# Patient Record
Sex: FEMALE | Race: WHITE | NOT HISPANIC OR LATINO | URBAN - METROPOLITAN AREA
[De-identification: names, ages, dates, MRNs, and addresses within clinical notes are randomized per-mention and may not be internally consistent; named-entity substitution may affect disease eponyms.]

---

## 2021-03-08 ENCOUNTER — EMERGENCY (EMERGENCY)
Facility: HOSPITAL | Age: 86
LOS: 0 days | Discharge: HOME | End: 2021-03-08
Attending: EMERGENCY MEDICINE
Payer: MEDICARE

## 2021-03-08 VITALS
TEMPERATURE: 98 F | DIASTOLIC BLOOD PRESSURE: 79 MMHG | WEIGHT: 124.78 LBS | SYSTOLIC BLOOD PRESSURE: 173 MMHG | HEART RATE: 116 BPM | RESPIRATION RATE: 19 BRPM | OXYGEN SATURATION: 98 %

## 2021-03-08 VITALS
HEART RATE: 93 BPM | OXYGEN SATURATION: 97 % | DIASTOLIC BLOOD PRESSURE: 83 MMHG | SYSTOLIC BLOOD PRESSURE: 173 MMHG | TEMPERATURE: 97 F | RESPIRATION RATE: 18 BRPM

## 2021-03-08 DIAGNOSIS — S42.222A 2-PART DISPLACED FRACTURE OF SURGICAL NECK OF LEFT HUMERUS, INITIAL ENCOUNTER FOR CLOSED FRACTURE: ICD-10-CM

## 2021-03-08 DIAGNOSIS — S12.9XXA FRACTURE OF NECK, UNSPECIFIED, INITIAL ENCOUNTER: ICD-10-CM
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
## 2021-03-08 PROCEDURE — 71045 X-RAY EXAM CHEST 1 VIEW: CPT | Mod: 26

## 2021-03-08 PROCEDURE — 73060 X-RAY EXAM OF HUMERUS: CPT | Mod: 26,LT

## 2021-03-08 PROCEDURE — 73070 X-RAY EXAM OF ELBOW: CPT | Mod: 26,LT

## 2021-03-08 PROCEDURE — 73030 X-RAY EXAM OF SHOULDER: CPT | Mod: 26,LT

## 2021-03-08 PROCEDURE — 93010 ELECTROCARDIOGRAM REPORT: CPT

## 2021-03-08 PROCEDURE — 70486 CT MAXILLOFACIAL W/O DYE: CPT | Mod: 26,MA

## 2021-03-08 PROCEDURE — 99285 EMERGENCY DEPT VISIT HI MDM: CPT

## 2021-03-08 PROCEDURE — 70450 CT HEAD/BRAIN W/O DYE: CPT | Mod: 26,MA

## 2021-03-08 PROCEDURE — 72125 CT NECK SPINE W/O DYE: CPT | Mod: 26,MA

## 2021-03-08 RX ORDER — ACETAMINOPHEN 500 MG
1 TABLET ORAL
Qty: 28 | Refills: 0
Start: 2021-03-08 | End: 2021-03-14

## 2021-03-08 RX ORDER — ACETAMINOPHEN 500 MG
975 TABLET ORAL ONCE
Refills: 0 | Status: COMPLETED | OUTPATIENT
Start: 2021-03-08 | End: 2021-03-08

## 2021-03-08 RX ADMIN — Medication 975 MILLIGRAM(S): at 15:46

## 2021-03-08 NOTE — ED PROVIDER NOTE - PHYSICAL EXAMINATION
Gen: Alert, NAD  Head: bruising left supraorbital region   Eyes: PERRL, EOMI, normal lids/conjunctiva  ENT: normal hearing, patent oropharynx without erythema/exudate, uvula midline  Neck: supple, no tenderness, Trachea midline  Pulm: Bilateral BS, normal resp effort, no wheeze/stridor/retractions  CV: RRR, no M/R/G, 2+ radial and dp pulses bl, no edema  Abd: soft, NT/ND, +BS, no hepatosplenomegaly  Mskel: extremities x4 with normal ROM and no joint effusions. no ctl spine ttp.   Skin: bruising and soft tissue swelling of the left upper arm  Neuro: AAOx3, no sensory/motor deficits, CN 2-12 intact

## 2021-03-08 NOTE — ED PROVIDER NOTE - NSFOLLOWUPINSTRUCTIONS_ED_ALL_ED_FT
You have a fracture of the neck of your humerus (upper arm just below shoulder).     This does not require surgery.     Take the pain medicine as needed and ice the shoulder as often as you can.     Call the orthopedist for a follow up appointment as soon as possible.

## 2021-03-08 NOTE — ED PROVIDER NOTE - OBJECTIVE STATEMENT
89F hx gerd and depression pw [facility] fall this am. patient fell coming out of bed hit her head and left shoulder. no loc. went to urgent care today because unable to life arm. they sent her here. she has not cp, sob, nausea, vomiting, ha, vision loss, epistaxis, rash, bleeding, numbness, hematuria. she notes bruising and pain at the left arm that are bad only when she moves it

## 2021-03-08 NOTE — ED ADULT TRIAGE NOTE - CHIEF COMPLAINT QUOTE
89 y/o female with no PMH. Presents to the ED with Left shoulder pain and bruise above the left eyebrow bone. pt states " I was putting the trash out last night when I lost my balance and fell onto the left side of my body." pt denies any loc, dizziness or weakness prior to fall , and no blood thinner use.

## 2021-03-08 NOTE — ED ADULT NURSE NOTE - OBJECTIVE STATEMENT
pt a&O x4, ambulatory at baseline. bruising to left tempora l. no open wounds . pt states no PMH. Presents to the ED with Left shoulder pain and bruise above the left  temporal. pt states " I was putting the trash out last night when I lost my balance and fell onto the left side of my body." pt denies any loc, dizziness or weakness prior to fall , and no blood thinner use. pt denies loc.

## 2021-03-08 NOTE — ED ADULT NURSE REASSESSMENT NOTE - NS ED NURSE REASSESS COMMENT FT1
pt expressed relief of pain once sling was placed on left arm. hematoma noted to left upper arm, cap refill <2 seconds in b/l upper extremities. pt and family verbalized understanding of follow-up with ortho and sling use.

## 2021-03-08 NOTE — ED ADULT NURSE NOTE - CHIEF COMPLAINT QUOTE
89 y/o female with no PMH. Presents to the ED with Left shoulder pain and bruise above the left eyebrow bone. pt states " I was putting the trash out last night when I lost my balance and fell onto the left side of my body." pt denies any loc, dizziness or weakness prior to fall , and no blood thinner use.

## 2021-03-08 NOTE — ED PROVIDER NOTE - CLINICAL SUMMARY MEDICAL DECISION MAKING FREE TEXT BOX
fall. likely humerus fx. fall. likely humerus fx. xray confirms humeral neck fx. can place in sling and dc home fall. likely humerus fx. xray confirms humeral neck fx. can place in sling and dc home  As interpreted by ED physician, ECG is NSR with normal intervals/axis, no changes in QRS, no ST/T changes.

## 2021-03-08 NOTE — ED PROVIDER NOTE - CARE PLAN
Principal Discharge DX:	Closed 2-part displaced fracture of surgical neck of left humerus, initial encounter

## 2021-03-08 NOTE — ED PROVIDER NOTE - PATIENT PORTAL LINK FT
You can access the FollowMyHealth Patient Portal offered by [facility] by registering at the following website: [email or web address]. By joining [employer]’s FollowMyHealth portal, you will also be able to view your health information using other applications (apps) compatible with our system.

## 2021-03-08 NOTE — ED PROVIDER NOTE - CARE PROVIDER_API CALL
Tab Barton (DO)  Orthopaedic Surgery  [address] [place] [address]  Phone: [phone]  Fax: [phone]  Follow Up Time: 1-3 Days

## 2022-12-31 ENCOUNTER — TRANSCRIPTION ENCOUNTER (OUTPATIENT)
Age: 87
End: 2022-12-31

## 2022-12-31 ENCOUNTER — INPATIENT (INPATIENT)
Facility: HOSPITAL | Age: 87
LOS: 6 days | Discharge: HOME HEALTH | End: 2023-01-07
Attending: STUDENT IN AN ORGANIZED HEALTH CARE EDUCATION/TRAINING PROGRAM | Admitting: STUDENT IN AN ORGANIZED HEALTH CARE EDUCATION/TRAINING PROGRAM
Payer: MEDICARE

## 2022-12-31 VITALS
TEMPERATURE: 98 F | HEART RATE: 100 BPM | RESPIRATION RATE: 18 BRPM | OXYGEN SATURATION: 100 % | DIASTOLIC BLOOD PRESSURE: 62 MMHG | SYSTOLIC BLOOD PRESSURE: 146 MMHG

## 2022-12-31 DIAGNOSIS — K21.9 GASTRO-ESOPHAGEAL REFLUX DISEASE WITHOUT ESOPHAGITIS: ICD-10-CM

## 2022-12-31 DIAGNOSIS — Z01.818 ENCOUNTER FOR OTHER PREPROCEDURAL EXAMINATION: ICD-10-CM

## 2022-12-31 DIAGNOSIS — F41.9 ANXIETY DISORDER, UNSPECIFIED: ICD-10-CM

## 2022-12-31 DIAGNOSIS — D72.829 ELEVATED WHITE BLOOD CELL COUNT, UNSPECIFIED: ICD-10-CM

## 2022-12-31 DIAGNOSIS — S72.009A FRACTURE OF UNSPECIFIED PART OF NECK OF UNSPECIFIED FEMUR, INITIAL ENCOUNTER FOR CLOSED FRACTURE: ICD-10-CM

## 2022-12-31 DIAGNOSIS — S72.142A DISPLACED INTERTROCHANTERIC FRACTURE OF LEFT FEMUR, INITIAL ENCOUNTER FOR CLOSED FRACTURE: ICD-10-CM

## 2022-12-31 LAB
ALBUMIN SERPL ELPH-MCNC: 4.2 G/DL — SIGNIFICANT CHANGE UP (ref 3.3–5)
ALP SERPL-CCNC: 80 U/L — SIGNIFICANT CHANGE UP (ref 40–120)
ALT FLD-CCNC: 11 U/L — SIGNIFICANT CHANGE UP (ref 4–33)
ANION GAP SERPL CALC-SCNC: 13 MMOL/L — SIGNIFICANT CHANGE UP (ref 7–14)
APTT BLD: 23.6 SEC — LOW (ref 27–36.3)
AST SERPL-CCNC: 22 U/L — SIGNIFICANT CHANGE UP (ref 4–32)
BASOPHILS # BLD AUTO: 0 K/UL — SIGNIFICANT CHANGE UP (ref 0–0.2)
BASOPHILS NFR BLD AUTO: 0 % — SIGNIFICANT CHANGE UP (ref 0–2)
BILIRUB SERPL-MCNC: 0.4 MG/DL — SIGNIFICANT CHANGE UP (ref 0.2–1.2)
BUN SERPL-MCNC: 17 MG/DL — SIGNIFICANT CHANGE UP (ref 7–23)
CALCIUM SERPL-MCNC: 9.6 MG/DL — SIGNIFICANT CHANGE UP (ref 8.4–10.5)
CHLORIDE SERPL-SCNC: 104 MMOL/L — SIGNIFICANT CHANGE UP (ref 98–107)
CK SERPL-CCNC: 73 U/L — SIGNIFICANT CHANGE UP (ref 25–170)
CO2 SERPL-SCNC: 24 MMOL/L — SIGNIFICANT CHANGE UP (ref 22–31)
CREAT SERPL-MCNC: 0.93 MG/DL — SIGNIFICANT CHANGE UP (ref 0.5–1.3)
EGFR: 58 ML/MIN/1.73M2 — LOW
EOSINOPHIL # BLD AUTO: 0 K/UL — SIGNIFICANT CHANGE UP (ref 0–0.5)
EOSINOPHIL NFR BLD AUTO: 0 % — SIGNIFICANT CHANGE UP (ref 0–6)
GLUCOSE SERPL-MCNC: 164 MG/DL — HIGH (ref 70–99)
HCT VFR BLD CALC: 35.1 % — SIGNIFICANT CHANGE UP (ref 34.5–45)
HGB BLD-MCNC: 11.4 G/DL — LOW (ref 11.5–15.5)
IANC: 13.05 K/UL — HIGH (ref 1.8–7.4)
INR BLD: 1.01 RATIO — SIGNIFICANT CHANGE UP (ref 0.88–1.16)
LYMPHOCYTES # BLD AUTO: 0.36 K/UL — LOW (ref 1–3.3)
LYMPHOCYTES # BLD AUTO: 2.6 % — LOW (ref 13–44)
MANUAL SMEAR VERIFICATION: SIGNIFICANT CHANGE UP
MCHC RBC-ENTMCNC: 29.2 PG — SIGNIFICANT CHANGE UP (ref 27–34)
MCHC RBC-ENTMCNC: 32.5 GM/DL — SIGNIFICANT CHANGE UP (ref 32–36)
MCV RBC AUTO: 90 FL — SIGNIFICANT CHANGE UP (ref 80–100)
MONOCYTES # BLD AUTO: 0.24 K/UL — SIGNIFICANT CHANGE UP (ref 0–0.9)
MONOCYTES NFR BLD AUTO: 1.7 % — LOW (ref 2–14)
NEUTROPHILS # BLD AUTO: 13.31 K/UL — HIGH (ref 1.8–7.4)
NEUTROPHILS NFR BLD AUTO: 95.7 % — HIGH (ref 43–77)
PLAT MORPH BLD: NORMAL — SIGNIFICANT CHANGE UP
PLATELET # BLD AUTO: 300 K/UL — SIGNIFICANT CHANGE UP (ref 150–400)
PLATELET COUNT - ESTIMATE: NORMAL — SIGNIFICANT CHANGE UP
POLYCHROMASIA BLD QL SMEAR: SLIGHT — SIGNIFICANT CHANGE UP
POTASSIUM SERPL-MCNC: 5 MMOL/L — SIGNIFICANT CHANGE UP (ref 3.5–5.3)
POTASSIUM SERPL-SCNC: 5 MMOL/L — SIGNIFICANT CHANGE UP (ref 3.5–5.3)
PROT SERPL-MCNC: 7.1 G/DL — SIGNIFICANT CHANGE UP (ref 6–8.3)
PROTHROM AB SERPL-ACNC: 11.7 SEC — SIGNIFICANT CHANGE UP (ref 10.5–13.4)
RBC # BLD: 3.9 M/UL — SIGNIFICANT CHANGE UP (ref 3.8–5.2)
RBC # FLD: 13.1 % — SIGNIFICANT CHANGE UP (ref 10.3–14.5)
RBC BLD AUTO: NORMAL — SIGNIFICANT CHANGE UP
SODIUM SERPL-SCNC: 141 MMOL/L — SIGNIFICANT CHANGE UP (ref 135–145)
WBC # BLD: 13.91 K/UL — HIGH (ref 3.8–10.5)
WBC # FLD AUTO: 13.91 K/UL — HIGH (ref 3.8–10.5)

## 2022-12-31 PROCEDURE — 99222 1ST HOSP IP/OBS MODERATE 55: CPT

## 2022-12-31 PROCEDURE — 99284 EMERGENCY DEPT VISIT MOD MDM: CPT

## 2022-12-31 PROCEDURE — 72192 CT PELVIS W/O DYE: CPT | Mod: 26,MA

## 2022-12-31 PROCEDURE — 71045 X-RAY EXAM CHEST 1 VIEW: CPT | Mod: 26

## 2022-12-31 PROCEDURE — 73502 X-RAY EXAM HIP UNI 2-3 VIEWS: CPT | Mod: 26,LT

## 2022-12-31 PROCEDURE — 70450 CT HEAD/BRAIN W/O DYE: CPT | Mod: 26,MA

## 2022-12-31 PROCEDURE — 73552 X-RAY EXAM OF FEMUR 2/>: CPT | Mod: 26,LT

## 2022-12-31 RX ORDER — MAGNESIUM HYDROXIDE 400 MG/1
30 TABLET, CHEWABLE ORAL DAILY
Refills: 0 | Status: DISCONTINUED | OUTPATIENT
Start: 2022-12-31 | End: 2023-01-07

## 2022-12-31 RX ORDER — ESCITALOPRAM OXALATE 10 MG/1
1 TABLET, FILM COATED ORAL
Qty: 0 | Refills: 0 | DISCHARGE

## 2022-12-31 RX ORDER — ACETAMINOPHEN 500 MG
975 TABLET ORAL ONCE
Refills: 0 | Status: COMPLETED | OUTPATIENT
Start: 2022-12-31 | End: 2022-12-31

## 2022-12-31 RX ORDER — FAMOTIDINE 10 MG/ML
20 INJECTION INTRAVENOUS EVERY 24 HOURS
Refills: 0 | Status: DISCONTINUED | OUTPATIENT
Start: 2022-12-31 | End: 2023-01-07

## 2022-12-31 RX ORDER — OXYCODONE HYDROCHLORIDE 5 MG/1
5 TABLET ORAL EVERY 4 HOURS
Refills: 0 | Status: DISCONTINUED | OUTPATIENT
Start: 2022-12-31 | End: 2023-01-07

## 2022-12-31 RX ORDER — OXYCODONE HYDROCHLORIDE 5 MG/1
2.5 TABLET ORAL EVERY 4 HOURS
Refills: 0 | Status: DISCONTINUED | OUTPATIENT
Start: 2022-12-31 | End: 2023-01-07

## 2022-12-31 RX ORDER — HEPARIN SODIUM 5000 [USP'U]/ML
5000 INJECTION INTRAVENOUS; SUBCUTANEOUS ONCE
Refills: 0 | Status: COMPLETED | OUTPATIENT
Start: 2022-12-31 | End: 2022-12-31

## 2022-12-31 RX ORDER — MORPHINE SULFATE 50 MG/1
4 CAPSULE, EXTENDED RELEASE ORAL ONCE
Refills: 0 | Status: DISCONTINUED | OUTPATIENT
Start: 2022-12-31 | End: 2022-12-31

## 2022-12-31 RX ORDER — ESCITALOPRAM OXALATE 10 MG/1
10 TABLET, FILM COATED ORAL EVERY 24 HOURS
Refills: 0 | Status: DISCONTINUED | OUTPATIENT
Start: 2022-12-31 | End: 2023-01-07

## 2022-12-31 RX ORDER — ACETAMINOPHEN 500 MG
975 TABLET ORAL EVERY 8 HOURS
Refills: 0 | Status: DISCONTINUED | OUTPATIENT
Start: 2022-12-31 | End: 2023-01-07

## 2022-12-31 RX ORDER — SENNA PLUS 8.6 MG/1
2 TABLET ORAL AT BEDTIME
Refills: 0 | Status: DISCONTINUED | OUTPATIENT
Start: 2022-12-31 | End: 2023-01-07

## 2022-12-31 RX ADMIN — MORPHINE SULFATE 4 MILLIGRAM(S): 50 CAPSULE, EXTENDED RELEASE ORAL at 20:10

## 2022-12-31 RX ADMIN — HEPARIN SODIUM 5000 UNIT(S): 5000 INJECTION INTRAVENOUS; SUBCUTANEOUS at 23:12

## 2022-12-31 RX ADMIN — Medication 975 MILLIGRAM(S): at 20:10

## 2022-12-31 NOTE — ED ADULT TRIAGE NOTE - CHIEF COMPLAINT QUOTE
pt c/o pain to left upper leg after she slipped in the bathtub.  pt hit the outside of the leg.  no deformity noted.  pt was able to bear weight on scene pt c/o pain to left upper leg after she slipped in the bathtub.  pt hit the outside of the leg.  mild shortening noted.  pt was able to bear weight on scene

## 2022-12-31 NOTE — H&P ADULT - HISTORY OF PRESENT ILLNESS
HPI  89y Female presents s/p [facility] fall c/o severe Left hip pain and inability to ambulate.  Patient denies headstrike or LOC. Patient denies radiation of pain. Patient denies numbness/tingling/burning in the LLE. No other bone/joint complaints. Patient is a community ambulator at baseline without assistive devices.    PAST MEDICAL & SURGICAL HISTORY:  No pertinent past medical history      No significant past surgical history        MEDICATIONS  (STANDING):    MEDICATIONS  (PRN):    Allergies    No Known Allergies    Intolerances                              11.4   13.91 )-----------( 300      ( 31 Dec 2022 20:30 )             35.1     12-31    141  |  104  |  17  ----------------------------<  164<H>  5.0   |  24  |  0.93    Ca    9.6      31 Dec 2022 20:30    TPro  7.1  /  Alb  4.2  /  TBili  0.4  /  DBili  x   /  AST  22  /  ALT  11  /  AlkPhos  80  12-31    PT/INR - ( 31 Dec 2022 20:30 )   PT: 11.7 sec;   INR: 1.01 ratio         PTT - ( 31 Dec 2022 20:30 )  PTT:23.6 sec    T(C): 36.8 (12-31-22 @ 18:26), Max: 36.8 (12-31-22 @ 18:26)  HR: 102 (12-31-22 @ 20:08) (100 - 102)  BP: 144/72 (12-31-22 @ 20:08) (144/72 - 146/62)  RR: 16 (12-31-22 @ 20:08) (16 - 18)  SpO2: 99% (12-31-22 @ 20:08) (99% - 100%)  Wt(kg): --    Physical Exam  Gen: NAD  Resp: Non-labored breathing  LLE:  Skin intact  No ecchymosis  Minimal soft tissue swelling of proximal thigh  + TTP about hip   ROM lmited 2/2 pain   Unable to SLR  + Log Roll/Heel Strike  No TTP to knee/leg/ankle/foot   Motor intact GS/TA/FHL/EHL  SILT L2-S1  Compartments soft  DP pulses palpable    _LE/BUE:   No bony TTP; Good ROM w/o pain; Exam Unremarkable    Imaging:  XR demonstrating L  intertrochanteric femur fracture

## 2022-12-31 NOTE — ED PROVIDER NOTE - OBJECTIVE STATEMENT
Attendin-year-old female presents with left hip pain status post trip and fall today.  Patient was using the bathroom and lost her balance when she was getting off the toilet and fell landing on her left hip.  Patient was unable to get up and was on the ground for a long period of time today until she was found by her daughter this afternoon.  Patient is alert and oriented.  Denies other pain complaints.  Patient states she did not hit her head.  Past medical history is significant for depression and GERD.  She only takes an antidepressant and a H2 blocker.

## 2022-12-31 NOTE — ED PROVIDER NOTE - CLINICAL SUMMARY MEDICAL DECISION MAKING FREE TEXT BOX
Left hip pain status post mechanical fall earlier today.  We will get x-rays of the hip and give pain medication.  High likelihood of fracture.  Will consult orthopedics if hip is fractured.  We will also get CT of head because of unwitnessed fall and patient's age.

## 2022-12-31 NOTE — H&P ADULT - ATTENDING COMMENTS
Patient seen and examined. Ms. Ashford is a 89 year old female, PMH GERD, Depression, who presented to the [facility] Emergency Department following a fall at home. Patient was in the bathroom, when she lost her balance and fell onto her left side. She reports no head strike. Patient was unable to get up and was on the floor for a few hours. She states that her neighbor came over to check on her and found the patient on the ground. Patient was then brought to the [facility] Emergency Department for evaluation. CT Head was negative. XRays showed a left intertrochanteric hip fracture. Orthopedics was consulted and patient was admitted for operative management. Medicine was consulted and patient was cleared for surgery. Patient reports no hip pain prior to fall. She has been walking with a cane for the last two months. Patient currently lives alone.    Exam:  Patient Alert & Oriented x4  Skin intact, no lesions or erythema  Left lower Extremity: Shortened, Externally Rotated  Motor: Grossly intact EHL/FHL/Tibialis Anterior/Gastrocnemius  Sensory: Grossly intact superficial peroneal/deep peroneal/saphenous/sural/tibial nerves  Vascular: 2+ DP Pulse  No tenderness over the right lower extremity or left tibia  No tenderness over the bilateral upper extremities  No pain with range of motion of the right lower extremity or bilateral upper extremities    Assessment/Plan:  Ms. Ashford is a 89 year old female with a left intertrochanteric hip fracture. Discussed with the patient that she has a left intertrochanteric hip fracture. Discussed the management of her hip fracture, including open reduction, internal fixation with an intramedullary nail. Discussed the implant and surgical procedure. Discussed the recovery from ORIF, including hospital stay, possible rehab placement and postoperative ambulation. Discussed the risks of surgery including, but not limited to, blood loss, injuries to nerves and vessels, heart attack, stroke, death, organ failure, nonunion, malunion, hardware failure, failure to achieve desired results, decreased ambulation, and infection. Discussed the alternatives to surgery, including nonoperative management. Discussed that nonoperative management would consist of bedrest for several weeks. Discussed the risks of nonoperative management, including continued pain, pneumonia, UTI, pressure ulcers, and death. Patient would like to proceed with surgery. Informed consent was obtained. Patient was alert and oriented x4.    Plan:  - OR Today  - NPO  - Left Lower Extremity: Non-weight bearing  - Medicine Clearance  - IV Fluids  - Pain control

## 2022-12-31 NOTE — H&P ADULT - ASSESSMENT
Assessment and Plan    Patient is a 89y y/o Female who presented with  hip pain found to have  femoral Left intertrochanteric femur fracture. Patient is currently hemodynamically stable.     - Pending CT head and CT hip  - Multimodal Pain control  - Bed Rest  - NPO/IVF after MN  - Suarez catheter  - CBC/BMP/Coags/UA/T+S x2  - EKG/CXR  - Medical clearance pending  - Plan for OR for IMN on 1/1/23    Orthopaedic Surgery  [facility] [id number]  [facility]        [id number]  [facility]  [id number]/ [phone]

## 2022-12-31 NOTE — CONSULT NOTE ADULT - PROBLEM SELECTOR RECOMMENDATION 5
wbc 13.4, likely reactive in setting of acute fx  - continue to monitor Stable. C/w famotidine 20 mg QD

## 2022-12-31 NOTE — ED ADULT NURSE NOTE - CHIEF COMPLAINT QUOTE
pt c/o pain to left upper leg after she slipped in the bathtub.  pt hit the outside of the leg.  mild shortening noted.  pt was able to bear weight on scene

## 2022-12-31 NOTE — CONSULT NOTE ADULT - ASSESSMENT
Pt is a 89 y/o F w/ pmhx of depression and GERD presenting with left hip pain after a mechanical fall at home today. Pt found to have left intertrochanteric fx. Pt seen and evaluated ortho and plan for OR 1/1/2023. Medicine consulted for preop evaluation.

## 2022-12-31 NOTE — CONSULT NOTE ADULT - PROBLEM SELECTOR RECOMMENDATION 9
Pt w/ left hip pain s/p mechanical fall found to have left intertrochanteric fx. Pt pending OR with ortho.  - management per ortho

## 2022-12-31 NOTE — ED ADULT NURSE NOTE - CAS EDN DISCHARGE INTERVENTIONS
Patient complaining of new onset of cough with a lot of sputum. Writer observed an unproductive, weak cough. Asked patient to call writer into room if coughs up any more sputum. Will continue to monitor.   IV intact

## 2022-12-31 NOTE — CONSULT NOTE ADULT - SUBJECTIVE AND OBJECTIVE BOX
HPI:  Pt is a 89 y/o F w/ pmhx of depression and GERD presenting with left hip pain after a mechanical fall at home today. Pt states she was getting up from the toilet when she lost her balance, slipped and hit her left side on the door and fell to the ground. She states she tried to get up how was unable to due to pain in her left hip. She denies any loss of consciousness, head trauma, chest pain, SOB, palpitations, light headedness or dizziness. She states she was otherwise in her usual state of health. She states she lives at home by herself and independent of her ADLs. She states she uses a cane sometimes to ambulate. She is able to walk up 1 flight of stairs without any chest pain or SOB. Of note, pt and pt's daughter states she had a normal nuclear stress test about 6 month ago.        Review of Systems:   CONSTITUTIONAL: No fever, weight loss, or fatigue  EYES: No eye pain, visual disturbances, or discharge  ENMT: No sinus or throat pain  NECK: No pain or stiffness  RESPIRATORY: No cough, wheezing, chills or hemoptysis; No shortness of breath  CARDIOVASCULAR: No chest pain, palpitations, dizziness, or leg swelling  GASTROINTESTINAL: No abdominal or epigastric pain. No nausea, vomiting, or hematemesis; No diarrhea or constipation. No melena or hematochezia.  GENITOURINARY: No dysuria, frequency, hematuria, or incontinence  NEUROLOGICAL: No headaches, loss of strength, numbness, or tremors  SKIN: No itching, burning, rashes, or lesions   LYMPH NODES: No enlarged glands  MUSCULOSKELETAL: +left hip pain  HEME/LYMPH: No easy bruising, or bleeding gums  ALLERY AND IMMUNOLOGIC: No hives or eczema    PAST MEDICAL & SURGICAL HISTORY:  Medical Hx:  GERD  depression    Surgical Hx  hernia repair  hysterectomy    Medications:  escitalopram 10 mg QD  famotidine 20 mg BID    Allergies    No Known Allergies    Social History: never smoker, occasional social alcohol use, no illicit drug use. Lives at home by herself. Independent of ADLs. Uses cane intermittently.     FAMILY HISTORY:  No family hx of heart disease.     MEDICATIONS  (STANDING):  acetaminophen     Tablet .. 975 milliGRAM(s) Oral every 8 hours  escitalopram 10 milliGRAM(s) Oral every 24 hours  famotidine    Tablet 20 milliGRAM(s) Oral every 24 hours  heparin   Injectable 5000 Unit(s) SubCutaneous once  senna 2 Tablet(s) Oral at bedtime    MEDICATIONS  (PRN):  magnesium hydroxide Suspension 30 milliLiter(s) Oral daily PRN Constipation  oxyCODONE    IR 2.5 milliGRAM(s) Oral every 4 hours PRN Moderate Pain (4 - 6)  oxyCODONE    IR 5 milliGRAM(s) Oral every 4 hours PRN Severe Pain (7 - 10)        CAPILLARY BLOOD GLUCOSE        I&O's Summary      PHYSICAL EXAM:  GENERAL: elderly female, NAD  HEAD:  Atraumatic, Normocephalic  EYES: EOMI, PERRLA, conjunctiva and sclera clear  NECK: Supple, No JVD  CHEST/LUNG: Clear to auscultation bilaterally; No wheeze  HEART: Regular rate and rhythm; No murmurs, rubs, or gallops  ABDOMEN: Soft, Nontender, Nondistended; Bowel sounds present  EXTREMITIES:  2+ Peripheral Pulses, No clubbing, cyanosis, or edema. LLE shortened and externally rotated. Left hip tender to palpation, limited ROM 2/2 pain.   PSYCH: AAOx3  NEUROLOGY: non-focal  SKIN: No rashes or lesions    LABS:                        11.4   13.91 )-----------( 300      ( 31 Dec 2022 20:30 )             35.1     12-31    141  |  104  |  17  ----------------------------<  164<H>  5.0   |  24  |  0.93    Ca    9.6      31 Dec 2022 20:30    TPro  7.1  /  Alb  4.2  /  TBili  0.4  /  DBili  x   /  AST  22  /  ALT  11  /  AlkPhos  80  12-31    PT/INR - ( 31 Dec 2022 20:30 )   PT: 11.7 sec;   INR: 1.01 ratio         PTT - ( 31 Dec 2022 20:30 )  PTT:23.6 sec  CARDIAC MARKERS ( 31 Dec 2022 20:30 )  x     / x     / 73 U/L / x     / x              RADIOLOGY & ADDITIONAL TESTS:    Imaging Personally Reviewed: CXR: no acute infiltrates or effusions.     EKG personally reviewed: NSR, no acute ischemic changes. Unchanged from prior EKG from 3/2021.     Consultant(s) Notes Reviewed:      Care Discussed with Consultants/Other Providers:   HPI:  Pt is a 89 y/o F w/ pmhx of depression and GERD presenting with left hip pain after a mechanical fall at home today. Pt states she was getting up from the toilet when she lost her balance, slipped and hit her left side on the door and fell to the ground. She states she tried to get up how was unable to due to pain in her left hip. She denies any loss of consciousness, head trauma, chest pain, SOB, palpitations, light headedness or dizziness. She states she was otherwise in her usual state of health. She states she lives at home by herself and independent of her ADLs. She states she uses a cane sometimes to ambulate. She is able to walk up 1 flight of stairs without any chest pain or SOB. Of note, pt and pt's daughter states she had a normal nuclear stress test and echocardiogram about 6 month ago.        Review of Systems:   CONSTITUTIONAL: No fever, weight loss, or fatigue  EYES: No eye pain, visual disturbances, or discharge  ENMT: No sinus or throat pain  NECK: No pain or stiffness  RESPIRATORY: No cough, wheezing, chills or hemoptysis; No shortness of breath  CARDIOVASCULAR: No chest pain, palpitations, dizziness, or leg swelling  GASTROINTESTINAL: No abdominal or epigastric pain. No nausea, vomiting, or hematemesis; No diarrhea or constipation. No melena or hematochezia.  GENITOURINARY: No dysuria, frequency, hematuria, or incontinence  NEUROLOGICAL: No headaches, loss of strength, numbness, or tremors  SKIN: No itching, burning, rashes, or lesions   LYMPH NODES: No enlarged glands  MUSCULOSKELETAL: +left hip pain  HEME/LYMPH: No easy bruising, or bleeding gums  ALLERY AND IMMUNOLOGIC: No hives or eczema    PAST MEDICAL & SURGICAL HISTORY:  Medical Hx:  GERD  depression    Surgical Hx  hernia repair  hysterectomy    Medications:  escitalopram 10 mg QD  famotidine 20 mg BID    Allergies    No Known Allergies    Social History: never smoker, occasional social alcohol use, no illicit drug use. Lives at home by herself. Independent of ADLs. Uses cane intermittently.     FAMILY HISTORY:  No family hx of heart disease.     MEDICATIONS  (STANDING):  acetaminophen     Tablet .. 975 milliGRAM(s) Oral every 8 hours  escitalopram 10 milliGRAM(s) Oral every 24 hours  famotidine    Tablet 20 milliGRAM(s) Oral every 24 hours  heparin   Injectable 5000 Unit(s) SubCutaneous once  senna 2 Tablet(s) Oral at bedtime    MEDICATIONS  (PRN):  magnesium hydroxide Suspension 30 milliLiter(s) Oral daily PRN Constipation  oxyCODONE    IR 2.5 milliGRAM(s) Oral every 4 hours PRN Moderate Pain (4 - 6)  oxyCODONE    IR 5 milliGRAM(s) Oral every 4 hours PRN Severe Pain (7 - 10)        CAPILLARY BLOOD GLUCOSE        I&O's Summary      PHYSICAL EXAM:  GENERAL: elderly female, NAD  HEAD:  Atraumatic, Normocephalic  EYES: EOMI, PERRLA, conjunctiva and sclera clear  NECK: Supple, No JVD  CHEST/LUNG: Clear to auscultation bilaterally; No wheeze  HEART: Regular rate and rhythm; No murmurs, rubs, or gallops  ABDOMEN: Soft, Nontender, Nondistended; Bowel sounds present  EXTREMITIES:  2+ Peripheral Pulses, No clubbing, cyanosis, or edema. LLE shortened and externally rotated. Left hip tender to palpation, limited ROM 2/2 pain.   PSYCH: AAOx3  NEUROLOGY: non-focal  SKIN: No rashes or lesions    LABS:                        11.4   13.91 )-----------( 300      ( 31 Dec 2022 20:30 )             35.1     12-31    141  |  104  |  17  ----------------------------<  164<H>  5.0   |  24  |  0.93    Ca    9.6      31 Dec 2022 20:30    TPro  7.1  /  Alb  4.2  /  TBili  0.4  /  DBili  x   /  AST  22  /  ALT  11  /  AlkPhos  80  12-31    PT/INR - ( 31 Dec 2022 20:30 )   PT: 11.7 sec;   INR: 1.01 ratio         PTT - ( 31 Dec 2022 20:30 )  PTT:23.6 sec  CARDIAC MARKERS ( 31 Dec 2022 20:30 )  x     / x     / 73 U/L / x     / x              RADIOLOGY & ADDITIONAL TESTS:    Imaging Personally Reviewed: CXR: no acute infiltrates or effusions.     EKG personally reviewed: NSR, no acute ischemic changes. Unchanged from prior EKG from 3/2021.     Consultant(s) Notes Reviewed:      Care Discussed with Consultants/Other Providers:   HPI:  Pt is a 89 y/o F w/ pmhx of depression and GERD presenting with left hip pain after a mechanical fall at home today. Pt states she was getting up from the toilet when she lost her balance, slipped and hit her left side on the door and fell to the ground. She states she tried to get up how was unable to due to pain in her left hip. She denies any loss of consciousness, head trauma, chest pain, SOB, palpitations, light headedness or dizziness. She states she was otherwise in her usual state of health. She states she lives at home by herself and independent of her ADLs. She states she uses a cane sometimes to ambulate. She is able to walk up 1 flight of stairs without any chest pain or SOB. Of note, pt and pt's daughter states she had a normal nuclear stress test and echocardiogram about 6 month ago.        Review of Systems:   CONSTITUTIONAL: No fever, weight loss, or fatigue  EYES: No eye pain, visual disturbances, or discharge  ENMT: No sinus or throat pain  NECK: No pain or stiffness  RESPIRATORY: No cough, wheezing, chills or hemoptysis; No shortness of breath  CARDIOVASCULAR: No chest pain, palpitations, dizziness, or leg swelling  GASTROINTESTINAL: No abdominal or epigastric pain. No nausea, vomiting, or hematemesis; No diarrhea or constipation. No melena or hematochezia.  GENITOURINARY: No dysuria, frequency, hematuria, or incontinence  NEUROLOGICAL: No headaches, loss of strength, numbness, or tremors  SKIN: No itching, burning, rashes, or lesions   LYMPH NODES: No enlarged glands  MUSCULOSKELETAL: +left hip pain  HEME/LYMPH: No easy bruising, or bleeding gums  ALLERY AND IMMUNOLOGIC: No hives or eczema    PAST MEDICAL & SURGICAL HISTORY:  Medical Hx:  GERD  depression    Surgical Hx  hernia repair  hysterectomy    Medications:  escitalopram 10 mg QD  famotidine 20 mg BID    Allergies    No Known Allergies    Social History: never smoker, occasional social alcohol use, no illicit drug use. Lives at home by herself. Independent of ADLs. Uses cane intermittently.     FAMILY HISTORY:  per pt's daughter, son with hx of CAD    MEDICATIONS  (STANDING):  acetaminophen     Tablet .. 975 milliGRAM(s) Oral every 8 hours  escitalopram 10 milliGRAM(s) Oral every 24 hours  famotidine    Tablet 20 milliGRAM(s) Oral every 24 hours  heparin   Injectable 5000 Unit(s) SubCutaneous once  senna 2 Tablet(s) Oral at bedtime    MEDICATIONS  (PRN):  magnesium hydroxide Suspension 30 milliLiter(s) Oral daily PRN Constipation  oxyCODONE    IR 2.5 milliGRAM(s) Oral every 4 hours PRN Moderate Pain (4 - 6)  oxyCODONE    IR 5 milliGRAM(s) Oral every 4 hours PRN Severe Pain (7 - 10)        CAPILLARY BLOOD GLUCOSE        I&O's Summary      PHYSICAL EXAM:  GENERAL: elderly female, NAD  HEAD:  Atraumatic, Normocephalic  EYES: EOMI, PERRLA, conjunctiva and sclera clear  NECK: Supple, No JVD  CHEST/LUNG: Clear to auscultation bilaterally; No wheeze  HEART: Regular rate and rhythm; No murmurs, rubs, or gallops  ABDOMEN: Soft, Nontender, Nondistended; Bowel sounds present  EXTREMITIES:  2+ Peripheral Pulses, No clubbing, cyanosis, or edema. LLE shortened and externally rotated. Left hip tender to palpation, limited ROM 2/2 pain.   PSYCH: AAOx3  NEUROLOGY: non-focal  SKIN: No rashes or lesions    LABS:                        11.4   13.91 )-----------( 300      ( 31 Dec 2022 20:30 )             35.1     12-31    141  |  104  |  17  ----------------------------<  164<H>  5.0   |  24  |  0.93    Ca    9.6      31 Dec 2022 20:30    TPro  7.1  /  Alb  4.2  /  TBili  0.4  /  DBili  x   /  AST  22  /  ALT  11  /  AlkPhos  80  12-31    PT/INR - ( 31 Dec 2022 20:30 )   PT: 11.7 sec;   INR: 1.01 ratio         PTT - ( 31 Dec 2022 20:30 )  PTT:23.6 sec  CARDIAC MARKERS ( 31 Dec 2022 20:30 )  x     / x     / 73 U/L / x     / x              RADIOLOGY & ADDITIONAL TESTS:    Imaging Personally Reviewed: CXR: no acute infiltrates or effusions.     EKG personally reviewed: NSR, no acute ischemic changes. Unchanged from prior EKG from 3/2021.     Consultant(s) Notes Reviewed:      Care Discussed with Consultants/Other Providers:

## 2022-12-31 NOTE — ED ADULT NURSE NOTE - OBJECTIVE STATEMENT
89 y/o F arrives to E.D. TR-A c/o left leg pain s/p unwitnessed mechanical fall in the bath tub today. Pt denies LOC/hitting head/AC use. A&Ox4, ambulatory at baseline, neg SOB, denies CP, neg N/V/D. Daughter at bedside. Awaiting orders.

## 2022-12-31 NOTE — CONSULT NOTE ADULT - PROBLEM SELECTOR RECOMMENDATION 3
Stable. C/w escitalopram 10 mg QD wbc 13.4, afebrile. Likely reactive in setting of acute fx  - continue to monitor

## 2022-12-31 NOTE — CONSULT NOTE ADULT - PROBLEM SELECTOR RECOMMENDATION 2
EKG showing NSR at without acute ischemic changes and with normal intervals  - pt with no active cardiac conditions, including unstable coronary syndrome, decompensated CHF, significant arrhythmias, or severe valvular disease  - RCRI Class I risk, which equates to a 3.9% 30-day risk of death, MI, or cardiac arrest  - Given that pt has good functional capacity with METs >=4 (pt able to go up 1 flight of stairs without any CP or SOB), pt does not need further cardiac testing prior to OR. EKG showing NSR without acute ischemic changes and with normal intervals. Pt with no active cardiac conditions, including unstable coronary syndrome, decompensated CHF, significant arrhythmias. RCRI Class I risk, which equates to a 3.9% 30-day risk of death, MI, or cardiac arrest.  - Given that pt has good functional capacity with METs >=4 (pt able to go up 1 flight of stairs without any CP or SOB), pt does not need further cardiac testing prior to OR. EKG showing NSR without acute ischemic changes and with normal intervals. Pt with no active cardiac conditions, including unstable coronary syndrome, decompensated CHF, significant arrhythmias. Pt with RCRI Class I risk, which equates to a 3.9% 30-day risk of death, MI, or cardiac arrest. METs >=4 (pt able to go up 1 flight of stairs without any CP or SOB). Per pt and pt's daughter, pt had previous negative nuclear stress test about 6 months ago.  - pt does not need further cardiac testing prior to OR. EKG showing NSR without acute ischemic changes and with normal intervals. Pt with no active cardiac conditions, including unstable coronary syndrome, decompensated CHF, significant arrhythmias. Pt with RCRI Class I risk, which equates to a 3.9% 30-day risk of death, MI, or cardiac arrest. METs >=4 (pt able to go up 1 flight of stairs without any CP or SOB). Per pt and pt's daughter, pt had previous negative nuclear stress test about 6 months ago. Would recommend obtaining records from pt's cardiologist.   - pt does not need further cardiac testing prior to OR.

## 2023-01-01 PROBLEM — Z78.9 OTHER SPECIFIED HEALTH STATUS: Chronic | Status: ACTIVE | Noted: 2021-03-08

## 2023-01-01 LAB
24R-OH-CALCIDIOL SERPL-MCNC: 27.7 NG/ML — LOW (ref 30–80)
ANION GAP SERPL CALC-SCNC: 9 MMOL/L — SIGNIFICANT CHANGE UP (ref 7–14)
ANION GAP SERPL CALC-SCNC: 9 MMOL/L — SIGNIFICANT CHANGE UP (ref 7–14)
APTT BLD: 24.7 SEC — LOW (ref 27–36.3)
BLD GP AB SCN SERPL QL: NEGATIVE — SIGNIFICANT CHANGE UP
BUN SERPL-MCNC: 19 MG/DL — SIGNIFICANT CHANGE UP (ref 7–23)
BUN SERPL-MCNC: 20 MG/DL — SIGNIFICANT CHANGE UP (ref 7–23)
CALCIUM SERPL-MCNC: 8.6 MG/DL — SIGNIFICANT CHANGE UP (ref 8.4–10.5)
CALCIUM SERPL-MCNC: 9.2 MG/DL — SIGNIFICANT CHANGE UP (ref 8.4–10.5)
CHLORIDE SERPL-SCNC: 104 MMOL/L — SIGNIFICANT CHANGE UP (ref 98–107)
CHLORIDE SERPL-SCNC: 105 MMOL/L — SIGNIFICANT CHANGE UP (ref 98–107)
CO2 SERPL-SCNC: 25 MMOL/L — SIGNIFICANT CHANGE UP (ref 22–31)
CO2 SERPL-SCNC: 26 MMOL/L — SIGNIFICANT CHANGE UP (ref 22–31)
CREAT SERPL-MCNC: 0.81 MG/DL — SIGNIFICANT CHANGE UP (ref 0.5–1.3)
CREAT SERPL-MCNC: 0.92 MG/DL — SIGNIFICANT CHANGE UP (ref 0.5–1.3)
EGFR: 58 ML/MIN/1.73M2 — LOW
EGFR: 68 ML/MIN/1.73M2 — SIGNIFICANT CHANGE UP
GLUCOSE SERPL-MCNC: 128 MG/DL — HIGH (ref 70–99)
GLUCOSE SERPL-MCNC: 176 MG/DL — HIGH (ref 70–99)
HCT VFR BLD CALC: 25.5 % — LOW (ref 34.5–45)
HCT VFR BLD CALC: 29.1 % — LOW (ref 34.5–45)
HGB BLD-MCNC: 8.2 G/DL — LOW (ref 11.5–15.5)
HGB BLD-MCNC: 9.5 G/DL — LOW (ref 11.5–15.5)
INR BLD: 1.09 RATIO — SIGNIFICANT CHANGE UP (ref 0.88–1.16)
MCHC RBC-ENTMCNC: 29.2 PG — SIGNIFICANT CHANGE UP (ref 27–34)
MCHC RBC-ENTMCNC: 29.4 PG — SIGNIFICANT CHANGE UP (ref 27–34)
MCHC RBC-ENTMCNC: 32.2 GM/DL — SIGNIFICANT CHANGE UP (ref 32–36)
MCHC RBC-ENTMCNC: 32.6 GM/DL — SIGNIFICANT CHANGE UP (ref 32–36)
MCV RBC AUTO: 89.5 FL — SIGNIFICANT CHANGE UP (ref 80–100)
MCV RBC AUTO: 91.4 FL — SIGNIFICANT CHANGE UP (ref 80–100)
NRBC # BLD: 0 /100 WBCS — SIGNIFICANT CHANGE UP (ref 0–0)
NRBC # BLD: 0 /100 WBCS — SIGNIFICANT CHANGE UP (ref 0–0)
NRBC # FLD: 0 K/UL — SIGNIFICANT CHANGE UP (ref 0–0)
NRBC # FLD: 0 K/UL — SIGNIFICANT CHANGE UP (ref 0–0)
PLATELET # BLD AUTO: 213 K/UL — SIGNIFICANT CHANGE UP (ref 150–400)
PLATELET # BLD AUTO: 254 K/UL — SIGNIFICANT CHANGE UP (ref 150–400)
POTASSIUM SERPL-MCNC: 5 MMOL/L — SIGNIFICANT CHANGE UP (ref 3.5–5.3)
POTASSIUM SERPL-MCNC: 5.2 MMOL/L — SIGNIFICANT CHANGE UP (ref 3.5–5.3)
POTASSIUM SERPL-SCNC: 5 MMOL/L — SIGNIFICANT CHANGE UP (ref 3.5–5.3)
POTASSIUM SERPL-SCNC: 5.2 MMOL/L — SIGNIFICANT CHANGE UP (ref 3.5–5.3)
PROTHROM AB SERPL-ACNC: 12.6 SEC — SIGNIFICANT CHANGE UP (ref 10.5–13.4)
RBC # BLD: 2.79 M/UL — LOW (ref 3.8–5.2)
RBC # BLD: 3.25 M/UL — LOW (ref 3.8–5.2)
RBC # FLD: 13.2 % — SIGNIFICANT CHANGE UP (ref 10.3–14.5)
RBC # FLD: 13.2 % — SIGNIFICANT CHANGE UP (ref 10.3–14.5)
RH IG SCN BLD-IMP: NEGATIVE — SIGNIFICANT CHANGE UP
SARS-COV-2 RNA SPEC QL NAA+PROBE: SIGNIFICANT CHANGE UP
SODIUM SERPL-SCNC: 138 MMOL/L — SIGNIFICANT CHANGE UP (ref 135–145)
SODIUM SERPL-SCNC: 140 MMOL/L — SIGNIFICANT CHANGE UP (ref 135–145)
WBC # BLD: 10.62 K/UL — HIGH (ref 3.8–10.5)
WBC # BLD: 12.62 K/UL — HIGH (ref 3.8–10.5)
WBC # FLD AUTO: 10.62 K/UL — HIGH (ref 3.8–10.5)
WBC # FLD AUTO: 12.62 K/UL — HIGH (ref 3.8–10.5)

## 2023-01-01 PROCEDURE — 27245 TREAT THIGH FRACTURE: CPT | Mod: LT

## 2023-01-01 DEVICE — NAIL FIX TI CANN TROCH 11MMX235 130 DEG: Type: IMPLANTABLE DEVICE | Status: FUNCTIONAL

## 2023-01-01 DEVICE — IMPLANTABLE DEVICE: Type: IMPLANTABLE DEVICE | Status: FUNCTIONAL

## 2023-01-01 RX ORDER — ONDANSETRON 8 MG/1
4 TABLET, FILM COATED ORAL ONCE
Refills: 0 | Status: DISCONTINUED | OUTPATIENT
Start: 2023-01-01 | End: 2023-01-01

## 2023-01-01 RX ORDER — CHLORHEXIDINE GLUCONATE 213 G/1000ML
1 SOLUTION TOPICAL
Refills: 0 | Status: COMPLETED | OUTPATIENT
Start: 2023-01-01 | End: 2023-01-04

## 2023-01-01 RX ORDER — POLYETHYLENE GLYCOL 3350 17 G/17G
17 POWDER, FOR SOLUTION ORAL DAILY
Refills: 0 | Status: DISCONTINUED | OUTPATIENT
Start: 2023-01-01 | End: 2023-01-07

## 2023-01-01 RX ORDER — SODIUM CHLORIDE 9 MG/ML
1000 INJECTION, SOLUTION INTRAVENOUS
Refills: 0 | Status: DISCONTINUED | OUTPATIENT
Start: 2023-01-01 | End: 2023-01-07

## 2023-01-01 RX ORDER — ACETAMINOPHEN 500 MG
625 TABLET ORAL ONCE
Refills: 0 | Status: COMPLETED | OUTPATIENT
Start: 2023-01-01 | End: 2023-01-01

## 2023-01-01 RX ORDER — CEFAZOLIN SODIUM 1 G
1000 VIAL (EA) INJECTION EVERY 12 HOURS
Refills: 0 | Status: COMPLETED | OUTPATIENT
Start: 2023-01-02 | End: 2023-01-02

## 2023-01-01 RX ORDER — POVIDONE-IODINE 5 %
1 AEROSOL (ML) TOPICAL ONCE
Refills: 0 | Status: COMPLETED | OUTPATIENT
Start: 2023-01-01 | End: 2023-01-01

## 2023-01-01 RX ORDER — ENOXAPARIN SODIUM 100 MG/ML
30 INJECTION SUBCUTANEOUS EVERY 24 HOURS
Refills: 0 | Status: DISCONTINUED | OUTPATIENT
Start: 2023-01-02 | End: 2023-01-06

## 2023-01-01 RX ORDER — FENTANYL CITRATE 50 UG/ML
25 INJECTION INTRAVENOUS
Refills: 0 | Status: DISCONTINUED | OUTPATIENT
Start: 2023-01-01 | End: 2023-01-01

## 2023-01-01 RX ORDER — ONDANSETRON 8 MG/1
4 TABLET, FILM COATED ORAL EVERY 6 HOURS
Refills: 0 | Status: DISCONTINUED | OUTPATIENT
Start: 2023-01-01 | End: 2023-01-07

## 2023-01-01 RX ORDER — HYDROMORPHONE HYDROCHLORIDE 2 MG/ML
0.25 INJECTION INTRAMUSCULAR; INTRAVENOUS; SUBCUTANEOUS
Refills: 0 | Status: DISCONTINUED | OUTPATIENT
Start: 2023-01-01 | End: 2023-01-01

## 2023-01-01 RX ORDER — SODIUM CHLORIDE 9 MG/ML
1000 INJECTION INTRAMUSCULAR; INTRAVENOUS; SUBCUTANEOUS
Refills: 0 | Status: DISCONTINUED | OUTPATIENT
Start: 2023-01-01 | End: 2023-01-07

## 2023-01-01 RX ADMIN — FAMOTIDINE 20 MILLIGRAM(S): 10 INJECTION INTRAVENOUS at 06:03

## 2023-01-01 RX ADMIN — CHLORHEXIDINE GLUCONATE 1 APPLICATION(S): 213 SOLUTION TOPICAL at 06:03

## 2023-01-01 RX ADMIN — OXYCODONE HYDROCHLORIDE 2.5 MILLIGRAM(S): 5 TABLET ORAL at 03:23

## 2023-01-01 RX ADMIN — Medication 975 MILLIGRAM(S): at 21:02

## 2023-01-01 RX ADMIN — Medication 1 APPLICATION(S): at 08:25

## 2023-01-01 RX ADMIN — Medication 250 MILLIGRAM(S): at 17:27

## 2023-01-01 RX ADMIN — SODIUM CHLORIDE 125 MILLILITER(S): 9 INJECTION INTRAMUSCULAR; INTRAVENOUS; SUBCUTANEOUS at 17:27

## 2023-01-01 RX ADMIN — OXYCODONE HYDROCHLORIDE 2.5 MILLIGRAM(S): 5 TABLET ORAL at 02:22

## 2023-01-01 RX ADMIN — Medication 975 MILLIGRAM(S): at 06:03

## 2023-01-01 RX ADMIN — SENNA PLUS 2 TABLET(S): 8.6 TABLET ORAL at 21:02

## 2023-01-01 RX ADMIN — ESCITALOPRAM OXALATE 10 MILLIGRAM(S): 10 TABLET, FILM COATED ORAL at 06:03

## 2023-01-01 RX ADMIN — Medication 625 MILLIGRAM(S): at 17:48

## 2023-01-01 NOTE — PROGRESS NOTE ADULT - SUBJECTIVE AND OBJECTIVE BOX
SUBJECTIVE:   Patient seen and examined at bedside. Pt doing generally well.    Pain well controlled.  States that daughter will be coming this morning before surgery.    OBJECTIVE:  Vital Signs Last 24 Hrs  T(C): 36.8 (01 Jan 2023 02:00), Max: 37.1 (01 Jan 2023 01:33)  T(F): 98.2 (01 Jan 2023 02:00), Max: 98.7 (01 Jan 2023 01:33)  HR: 84 (01 Jan 2023 02:00) (82 - 102)  BP: 145/59 (01 Jan 2023 02:00) (136/69 - 146/62)  BP(mean): --  RR: 18 (01 Jan 2023 02:00) (16 - 18)  SpO2: 99% (01 Jan 2023 02:00) (99% - 100%)    Parameters below as of 01 Jan 2023 02:00  Patient On (Oxygen Delivery Method): room air        General: NAD  Resp: Non-labored breathing, no accessory muscle use  Physical Exam  Gen: NAD  Resp: Non-labored breathing  LLE:  Skin intact  No ecchymosis  Minimal soft tissue swelling of proximal thigh  ROM lmited 2/2 pain   Motor intact GS/TA/FHL/EHL  no calf tenderness  SILT L2-S1  DP pulses palpable        LABS:                        9.5    10.62 )-----------( 254      ( 01 Jan 2023 03:51 )             29.1     01-01    140  |  105  |  19  ----------------------------<  128<H>  5.2   |  26  |  0.92    Ca    9.2      01 Jan 2023 03:51    TPro  7.1  /  Alb  4.2  /  TBili  0.4  /  DBili  x   /  AST  22  /  ALT  11  /  AlkPhos  80  12-31    I&O's Summary      MEDS:  MEDICATIONS  (STANDING):  acetaminophen     Tablet .. 975 milliGRAM(s) Oral every 8 hours  chlorhexidine 2% Cloths 1 Application(s) Topical <User Schedule>  escitalopram 10 milliGRAM(s) Oral every 24 hours  famotidine    Tablet 20 milliGRAM(s) Oral every 24 hours  lactated ringers. 1000 milliLiter(s) (75 mL/Hr) IV Continuous <Continuous>  povidone iodine 5% Nasal Swab 1 Application(s) Both Nostrils once  senna 2 Tablet(s) Oral at bedtime    MEDICATIONS  (PRN):  magnesium hydroxide Suspension 30 milliLiter(s) Oral daily PRN Constipation  oxyCODONE    IR 2.5 milliGRAM(s) Oral every 4 hours PRN Moderate Pain (4 - 6)  oxyCODONE    IR 5 milliGRAM(s) Oral every 4 hours PRN Severe Pain (7 - 10)

## 2023-01-01 NOTE — PATIENT PROFILE ADULT - LIVING ENVIRONMENT
Call returned to pt. Previously seen at both [facility] and [facility] by various providers for cervical dystonia and migraines. 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 Pt does not wish to pursue Botox at this point because she cannot afford it. She has financial assistance through [facility] but Medicare wasn't picking up enough of the co-pay.    She has had bilateral hand tremors since she was a child. Currently she takes baclofen as the only source of CD treatment. Appt offered for 11/9/22 @ 1:00pm with SAGAR Funk. Appt letter placed in the mail.    no

## 2023-01-01 NOTE — PROGRESS NOTE ADULT - ATTENDING COMMENTS
Patient seen and examined. Ms. Ashford is a 89 year old female, PMH GERD, Depression, who presented to the [facility] Emergency Department following a fall at home. Patient was in the bathroom, when she lost her balance and fell onto her left side. She reports no head strike. Patient was unable to get up and was on the floor for a few hours. She states that her neighbor came over to check on her and found the patient on the ground. Patient was then brought to the [facility] Emergency Department for evaluation. CT Head was negative. XRays showed a left intertrochanteric hip fracture. Orthopedics was consulted and patient was admitted for operative management. Medicine was consulted and patient was cleared for surgery. Patient reports no hip pain prior to fall. She has been walking with a cane for the last two months. Patient currently lives alone.    Exam:  Patient Alert & Oriented x4  Skin intact, no lesions or erythema  Left lower Extremity: Shortened, Externally Rotated  Motor: Grossly intact EHL/FHL/Tibialis Anterior/Gastrocnemius  Sensory: Grossly intact superficial peroneal/deep peroneal/saphenous/sural/tibial nerves  Vascular: 2+ DP Pulse  No tenderness over the right lower extremity or left tibia  No tenderness over the bilateral upper extremities  No pain with range of motion of the right lower extremity or bilateral upper extremities    Assessment/Plan:  Ms. Ashford is a 89 year old female with a left intertrochanteric hip fracture. Discussed with the patient that she has a left intertrochanteric hip fracture. Discussed the management of her hip fracture, including open reduction, internal fixation with an intramedullary nail. Discussed the implant and surgical procedure. Discussed the recovery from ORIF, including hospital stay, possible rehab placement and postoperative ambulation. Discussed the risks of surgery including, but not limited to, blood loss, injuries to nerves and vessels, heart attack, stroke, death, organ failure, nonunion, malunion, hardware failure, failure to achieve desired results, decreased ambulation, and infection. Discussed the alternatives to surgery, including nonoperative management. Discussed that nonoperative management would consist of bedrest for several weeks. Discussed the risks of nonoperative management, including continued pain, pneumonia, UTI, pressure ulcers, and death. Patient would like to proceed with surgery. Informed consent was obtained. Patient was alert and oriented x4. Spoke with patient's daughter, Delicia Rosado, who also consented for the surgery.    Plan:  - OR Today  - NPO  - Left Lower Extremity: Non-weight bearing  - Medicine Clearance  - IV Fluids  - Pain control

## 2023-01-01 NOTE — ED ADULT NURSE REASSESSMENT NOTE - NS ED NURSE REASSESS COMMENT FT1
Pt a&ox4, NAD, changed and turned, skin intact, no signs of breakdown or pressure ulcer. Respirations are even and unlabored, no signs of respiratory distress.

## 2023-01-02 DIAGNOSIS — D62 ACUTE POSTHEMORRHAGIC ANEMIA: ICD-10-CM

## 2023-01-02 DIAGNOSIS — Z29.9 ENCOUNTER FOR PROPHYLACTIC MEASURES, UNSPECIFIED: ICD-10-CM

## 2023-01-02 LAB
ANION GAP SERPL CALC-SCNC: 8 MMOL/L — SIGNIFICANT CHANGE UP (ref 7–14)
BASOPHILS # BLD AUTO: 0.03 K/UL — SIGNIFICANT CHANGE UP (ref 0–0.2)
BASOPHILS NFR BLD AUTO: 0.3 % — SIGNIFICANT CHANGE UP (ref 0–2)
BUN SERPL-MCNC: 19 MG/DL — SIGNIFICANT CHANGE UP (ref 7–23)
CALCIUM SERPL-MCNC: 8.7 MG/DL — SIGNIFICANT CHANGE UP (ref 8.4–10.5)
CHLORIDE SERPL-SCNC: 108 MMOL/L — HIGH (ref 98–107)
CO2 SERPL-SCNC: 27 MMOL/L — SIGNIFICANT CHANGE UP (ref 22–31)
CREAT SERPL-MCNC: 0.84 MG/DL — SIGNIFICANT CHANGE UP (ref 0.5–1.3)
EGFR: 65 ML/MIN/1.73M2 — SIGNIFICANT CHANGE UP
EOSINOPHIL # BLD AUTO: 0.09 K/UL — SIGNIFICANT CHANGE UP (ref 0–0.5)
EOSINOPHIL NFR BLD AUTO: 1 % — SIGNIFICANT CHANGE UP (ref 0–6)
GLUCOSE SERPL-MCNC: 114 MG/DL — HIGH (ref 70–99)
HCT VFR BLD CALC: 28.8 % — LOW (ref 34.5–45)
HGB BLD-MCNC: 9.5 G/DL — LOW (ref 11.5–15.5)
IANC: 6.77 K/UL — SIGNIFICANT CHANGE UP (ref 1.8–7.4)
IMM GRANULOCYTES NFR BLD AUTO: 0.5 % — SIGNIFICANT CHANGE UP (ref 0–0.9)
LYMPHOCYTES # BLD AUTO: 1.14 K/UL — SIGNIFICANT CHANGE UP (ref 1–3.3)
LYMPHOCYTES # BLD AUTO: 12.9 % — LOW (ref 13–44)
MCHC RBC-ENTMCNC: 29.2 PG — SIGNIFICANT CHANGE UP (ref 27–34)
MCHC RBC-ENTMCNC: 33 GM/DL — SIGNIFICANT CHANGE UP (ref 32–36)
MCV RBC AUTO: 88.6 FL — SIGNIFICANT CHANGE UP (ref 80–100)
MONOCYTES # BLD AUTO: 0.75 K/UL — SIGNIFICANT CHANGE UP (ref 0–0.9)
MONOCYTES NFR BLD AUTO: 8.5 % — SIGNIFICANT CHANGE UP (ref 2–14)
NEUTROPHILS # BLD AUTO: 6.77 K/UL — SIGNIFICANT CHANGE UP (ref 1.8–7.4)
NEUTROPHILS NFR BLD AUTO: 76.8 % — SIGNIFICANT CHANGE UP (ref 43–77)
NRBC # BLD: 0 /100 WBCS — SIGNIFICANT CHANGE UP (ref 0–0)
NRBC # FLD: 0 K/UL — SIGNIFICANT CHANGE UP (ref 0–0)
PLATELET # BLD AUTO: 186 K/UL — SIGNIFICANT CHANGE UP (ref 150–400)
POTASSIUM SERPL-MCNC: 4.7 MMOL/L — SIGNIFICANT CHANGE UP (ref 3.5–5.3)
POTASSIUM SERPL-SCNC: 4.7 MMOL/L — SIGNIFICANT CHANGE UP (ref 3.5–5.3)
RBC # BLD: 3.25 M/UL — LOW (ref 3.8–5.2)
RBC # FLD: 14 % — SIGNIFICANT CHANGE UP (ref 10.3–14.5)
SODIUM SERPL-SCNC: 143 MMOL/L — SIGNIFICANT CHANGE UP (ref 135–145)
WBC # BLD: 8.82 K/UL — SIGNIFICANT CHANGE UP (ref 3.8–10.5)
WBC # FLD AUTO: 8.82 K/UL — SIGNIFICANT CHANGE UP (ref 3.8–10.5)

## 2023-01-02 PROCEDURE — 99233 SBSQ HOSP IP/OBS HIGH 50: CPT

## 2023-01-02 RX ADMIN — Medication 975 MILLIGRAM(S): at 05:04

## 2023-01-02 RX ADMIN — ESCITALOPRAM OXALATE 10 MILLIGRAM(S): 10 TABLET, FILM COATED ORAL at 05:04

## 2023-01-02 RX ADMIN — ENOXAPARIN SODIUM 30 MILLIGRAM(S): 100 INJECTION SUBCUTANEOUS at 13:09

## 2023-01-02 RX ADMIN — OXYCODONE HYDROCHLORIDE 2.5 MILLIGRAM(S): 5 TABLET ORAL at 09:56

## 2023-01-02 RX ADMIN — OXYCODONE HYDROCHLORIDE 2.5 MILLIGRAM(S): 5 TABLET ORAL at 10:50

## 2023-01-02 RX ADMIN — SENNA PLUS 2 TABLET(S): 8.6 TABLET ORAL at 21:53

## 2023-01-02 RX ADMIN — Medication 975 MILLIGRAM(S): at 13:08

## 2023-01-02 RX ADMIN — Medication 100 MILLIGRAM(S): at 00:06

## 2023-01-02 RX ADMIN — Medication 975 MILLIGRAM(S): at 21:53

## 2023-01-02 RX ADMIN — FAMOTIDINE 20 MILLIGRAM(S): 10 INJECTION INTRAVENOUS at 05:04

## 2023-01-02 RX ADMIN — Medication 100 MILLIGRAM(S): at 12:27

## 2023-01-02 RX ADMIN — POLYETHYLENE GLYCOL 3350 17 GRAM(S): 17 POWDER, FOR SOLUTION ORAL at 12:27

## 2023-01-02 NOTE — PROGRESS NOTE ADULT - SUBJECTIVE AND OBJECTIVE BOX
ORTHO PROGRESS NOTE     Pt seen and examined at bedside, denies SOB, CP, Dizziness. N/V/D /HA.  No significant overnight events. Pain well controlled.    Vital Signs Last 24 Hrs  T(C): 36.3 (02 Jan 2023 05:17), Max: 37.2 (01 Jan 2023 18:50)  T(F): 97.4 (02 Jan 2023 05:17), Max: 98.9 (01 Jan 2023 18:50)  HR: 77 (02 Jan 2023 05:17) (75 - 106)  BP: 138/72 (02 Jan 2023 05:17) (100/59 - 163/96)  BP(mean): 75 (01 Jan 2023 17:30) (75 - 115)  RR: 18 (02 Jan 2023 05:17) (13 - 19)  SpO2: 100% (02 Jan 2023 05:17) (96% - 100%)    Parameters below as of 02 Jan 2023 05:17  Patient On (Oxygen Delivery Method): room air        Gen: NAD, alert and oriented  Resp: Unlabored breathing  LLE: Dressing c/d/i       SILT DP/SP/ Michelle/Saph/tib       5/5 EHL 5/5 FHL 5/5 TA 5/5 Gastroc 5/5 IP        DP+,        soft compartments, no calf ttp,       Labs:  CBC Full  -  ( 01 Jan 2023 17:00 )  WBC Count : 12.62 K/uL  RBC Count : 2.79 M/uL  Hemoglobin : 8.2 g/dL  Hematocrit : 25.5 %  Platelet Count - Automated : 213 K/uL  Mean Cell Volume : 91.4 fL  Mean Cell Hemoglobin : 29.4 pg  Mean Cell Hemoglobin Concentration : 32.2 gm/dL  Auto Neutrophil # : x  Auto Lymphocyte # : x  Auto Monocyte # : x  Auto Eosinophil # : x  Auto Basophil # : x  Auto Neutrophil % : x  Auto Lymphocyte % : x  Auto Monocyte % : x  Auto Eosinophil % : x  Auto Basophil % : x      01-01    138  |  104  |  20  ----------------------------<  176<H>  5.0   |  25  |  0.81    Ca    8.6      01 Jan 2023 17:00    TPro  7.1  /  Alb  4.2  /  TBili  0.4  /  DBili  x   /  AST  22  /  ALT  11  /  AlkPhos  80  12-31      A/P  Pt is a 89y Female s/p L IMN    - Pain control/ Analgesia  - DVT ppx: Lovenox  -PT/OT   -WBAT

## 2023-01-02 NOTE — PHYSICAL THERAPY INITIAL EVALUATION ADULT - IMPAIRMENTS CONTRIBUTING TO GAIT DEVIATIONS, PT EVAL
impaired endurance/impaired balance/impaired coordination/decreased flexibility/impaired motor control/impaired postural control/decreased ROM/decreased strength

## 2023-01-02 NOTE — PHYSICAL THERAPY INITIAL EVALUATION ADULT - ASR WT BEARING STATUS EVAL
No. EULA screening performed.  STOP BANG Legend: 0-2 = LOW Risk; 3-4 = INTERMEDIATE Risk; 5-8 = HIGH Risk
Left LE

## 2023-01-02 NOTE — PROGRESS NOTE ADULT - SUBJECTIVE AND OBJECTIVE BOX
CHIEF COMPLAINT: f/u left intertrochanteric femur fx    SUBJECTIVE / OVERNIGHT EVENTS: Patient seen and examined. No acute events overnight. Pain well controlled and patient without any complaints. Patient denies any chest pain or SOB.    MEDICATIONS  (STANDING):  acetaminophen     Tablet .. 975 milliGRAM(s) Oral every 8 hours  ceFAZolin   IVPB 1000 milliGRAM(s) IV Intermittent every 12 hours  chlorhexidine 2% Cloths 1 Application(s) Topical <User Schedule>  enoxaparin Injectable 40 milliGRAM(s) SubCutaneous every 24 hours  escitalopram 10 milliGRAM(s) Oral every 24 hours  famotidine    Tablet 20 milliGRAM(s) Oral every 24 hours  lactated ringers. 1000 milliLiter(s) (75 mL/Hr) IV Continuous <Continuous>  polyethylene glycol 3350 17 Gram(s) Oral daily  senna 2 Tablet(s) Oral at bedtime  sodium chloride 0.9%. 1000 milliLiter(s) (125 mL/Hr) IV Continuous <Continuous>    MEDICATIONS  (PRN):  bisacodyl Suppository 10 milliGRAM(s) Rectal daily PRN If no bowel movement by POD#2  magnesium hydroxide Suspension 30 milliLiter(s) Oral daily PRN Constipation  ondansetron Injectable 4 milliGRAM(s) IV Push every 6 hours PRN Nausea and/or Vomiting  oxyCODONE    IR 2.5 milliGRAM(s) Oral every 4 hours PRN Moderate Pain (4 - 6)  oxyCODONE    IR 5 milliGRAM(s) Oral every 4 hours PRN Severe Pain (7 - 10)      VITALS:  T(F): 97.4 (01-02-23 @ 05:17), Max: 98.9 (01-01-23 @ 18:50)  HR: 77 (01-02-23 @ 05:17) (77 - 106)  BP: 138/72 (01-02-23 @ 05:17) (100/59 - 163/96)  RR: 18 (01-02-23 @ 05:17) (13 - 19)  SpO2: 100% (01-02-23 @ 05:17)  Height (cm): 157.5 (10:33)  Weight (kg): 41.8 (10:33)  BMI (kg/m2): 16.9 (10:33)    PHYSICAL EXAM:  GENERAL: elderly female, NAD  CHEST/LUNG: Clear to auscultation bilaterally; No wheeze  HEART: Regular rate and rhythm; No murmurs, rubs, or gallops  ABDOMEN: Soft, Nontender, Nondistended; Bowel sounds present  EXTREMITIES:  2+ Peripheral Pulses, LLE dressing  SKIN: Dressing C/D/I     LABS:              9.5                  143  | 27   | 19           8.82  >-----------< 186     ------------------------< 114                   28.8                 4.7  | 108  | 0.84                                         Ca 8.7   Mg x     Ph x           TPro  7.1  /  Alb  4.2      TBili  0.4  /  DBili  x         AST  22  /  ALT  11            AlkPhos  80      INR: 1.09 ratio;    PT: 12.6 sec;    PTT: 24.7 sec<L>    CARDIAC MARKERS  x     / 73 U/L / x        RADIOLOGY & ADDITIONAL TESTS:  Imaging Personally Reviewed: [x] Yes  < from: CT Head No Cont (12.31.22 @ 22:15) >  IMPRESSION:  No acute intracranial hemorrhage, territorial infarct, mass effect or   calvarial fracture.  < end of copied text >    < from: CT Pelvis Bony Only No Cont (12.31.22 @ 22:15) >  IMPRESSION:  Acute comminuted intertrochanteric left hip fracture.  < end of copied text >    [ ] Consultant(s) Notes Reviewed:  [x] Care Discussed with Consultants/Other Providers: Orthopedic PA - discussed post-op care

## 2023-01-02 NOTE — OCCUPATIONAL THERAPY INITIAL EVALUATION ADULT - PERTINENT HX OF CURRENT PROBLEM, REHAB EVAL
Pt is a 89 year old female presenting s/p fall with severe left hip pain and inability to ambulate. XR significant for L intertrochanteric femur fracture. Pt s/p left hip IMN.

## 2023-01-02 NOTE — OCCUPATIONAL THERAPY INITIAL EVALUATION ADULT - RANGE OF MOTION EXAMINATION, UPPER EXTREMITY
except right shoulder 0-100 flexion active due to old injury/bilateral UE Active ROM was WFL  (within functional limits)

## 2023-01-02 NOTE — PHYSICAL THERAPY INITIAL EVALUATION ADULT - IMPAIRMENTS FOUND, PT EVAL
aerobic capacity/endurance/decreased midline orientation/ergonomics and body mechanics/gait, locomotion, and balance/gross motor/joint integrity and mobility/muscle strength/posture/ROM

## 2023-01-02 NOTE — PROGRESS NOTE ADULT - ATTENDING COMMENTS
Patient seen and examined. Ms. Ashford is a 89 year old female status post left hip IMN for left intertrochanteric hip fracture. No acute events overnight. Patient states that her pain is improving. She was able to walk from the bed to the bathroom. Pain is currently well controlled with pain medication.     Exam:  Patient Alert & Oriented x4  Dressing: Clean, Dry, Intact  Motor: Grossly intact EHL/FHL/Tibialis Anterior/Gastrocnemius  Sensory: Grossly intact superficial peroneal/deep peroneal/saphenous/sural/tibial nerves  Vascular: 2+ DP Pulse    Assessment/Plan:  Ms. Ashford is a 89 year old female status post left hip IMN for left intertrochanteric hip fracture, POD#1    Plan:  - Left Lower Extremity: Weight Bearing as Tolerated  - PT/OT, Out of Bed  - Pain Control: Per Pain Protocol  - DVT Prophylaxis: Lovenox 40mg daily  - Medicine Co-Management  - Disposition: Subacute Rehab

## 2023-01-02 NOTE — PHYSICAL THERAPY INITIAL EVALUATION ADULT - PLANNED THERAPY INTERVENTIONS, PT EVAL
bed mobility training/gait training/neuromuscular re-education/postural re-education/ROM/strengthening/transfer training

## 2023-01-02 NOTE — PHYSICAL THERAPY INITIAL EVALUATION ADULT - GAIT DEVIATIONS NOTED, PT EVAL
decreased wilmer/decreased velocity of limb motion/decreased step length/decreased stride length/decreased weight-shifting ability

## 2023-01-02 NOTE — PHYSICAL THERAPY INITIAL EVALUATION ADULT - ACTIVE RANGE OF MOTION EXAMINATION, REHAB EVAL
left knee extension lacking ~5 degrees; left hip flexion impaired/Left UE Active ROM was WNL (within normal limits)/Right UE Active ROM was WNL (within normal limits)/RLE Active ROM was WNL (within normal limits)/deficits as listed below

## 2023-01-02 NOTE — PHYSICAL THERAPY INITIAL EVALUATION ADULT - GENERAL OBSERVATIONS, REHAB EVAL
Upon entry, pt semi-supine in bed in NAD. Family present at bedside. Pt left seated in recliner with all tubes/lines intact, call bell in reach and in NAD.

## 2023-01-03 ENCOUNTER — TRANSCRIPTION ENCOUNTER (OUTPATIENT)
Age: 88
End: 2023-01-03

## 2023-01-03 LAB
ANION GAP SERPL CALC-SCNC: 11 MMOL/L — SIGNIFICANT CHANGE UP (ref 7–14)
BLD GP AB SCN SERPL QL: NEGATIVE — SIGNIFICANT CHANGE UP
BUN SERPL-MCNC: 15 MG/DL — SIGNIFICANT CHANGE UP (ref 7–23)
CALCIUM SERPL-MCNC: 9 MG/DL — SIGNIFICANT CHANGE UP (ref 8.4–10.5)
CHLORIDE SERPL-SCNC: 108 MMOL/L — HIGH (ref 98–107)
CO2 SERPL-SCNC: 23 MMOL/L — SIGNIFICANT CHANGE UP (ref 22–31)
CREAT SERPL-MCNC: 0.75 MG/DL — SIGNIFICANT CHANGE UP (ref 0.5–1.3)
EGFR: 75 ML/MIN/1.73M2 — SIGNIFICANT CHANGE UP
GLUCOSE SERPL-MCNC: 150 MG/DL — HIGH (ref 70–99)
HCT VFR BLD CALC: 28.6 % — LOW (ref 34.5–45)
HGB BLD-MCNC: 9.4 G/DL — LOW (ref 11.5–15.5)
MAGNESIUM SERPL-MCNC: 1.6 MG/DL — SIGNIFICANT CHANGE UP (ref 1.6–2.6)
MCHC RBC-ENTMCNC: 29.3 PG — SIGNIFICANT CHANGE UP (ref 27–34)
MCHC RBC-ENTMCNC: 32.9 GM/DL — SIGNIFICANT CHANGE UP (ref 32–36)
MCV RBC AUTO: 89.1 FL — SIGNIFICANT CHANGE UP (ref 80–100)
NRBC # BLD: 0 /100 WBCS — SIGNIFICANT CHANGE UP (ref 0–0)
NRBC # FLD: 0 K/UL — SIGNIFICANT CHANGE UP (ref 0–0)
PHOSPHATE SERPL-MCNC: 2.2 MG/DL — LOW (ref 2.5–4.5)
PLATELET # BLD AUTO: 222 K/UL — SIGNIFICANT CHANGE UP (ref 150–400)
POTASSIUM SERPL-MCNC: 4.3 MMOL/L — SIGNIFICANT CHANGE UP (ref 3.5–5.3)
POTASSIUM SERPL-SCNC: 4.3 MMOL/L — SIGNIFICANT CHANGE UP (ref 3.5–5.3)
RBC # BLD: 3.21 M/UL — LOW (ref 3.8–5.2)
RBC # FLD: 14.4 % — SIGNIFICANT CHANGE UP (ref 10.3–14.5)
RH IG SCN BLD-IMP: NEGATIVE — SIGNIFICANT CHANGE UP
SARS-COV-2 RNA SPEC QL NAA+PROBE: SIGNIFICANT CHANGE UP
SODIUM SERPL-SCNC: 142 MMOL/L — SIGNIFICANT CHANGE UP (ref 135–145)
WBC # BLD: 10.27 K/UL — SIGNIFICANT CHANGE UP (ref 3.8–10.5)
WBC # FLD AUTO: 10.27 K/UL — SIGNIFICANT CHANGE UP (ref 3.8–10.5)

## 2023-01-03 PROCEDURE — 99233 SBSQ HOSP IP/OBS HIGH 50: CPT

## 2023-01-03 RX ADMIN — SENNA PLUS 2 TABLET(S): 8.6 TABLET ORAL at 21:17

## 2023-01-03 RX ADMIN — FAMOTIDINE 20 MILLIGRAM(S): 10 INJECTION INTRAVENOUS at 05:52

## 2023-01-03 RX ADMIN — Medication 975 MILLIGRAM(S): at 13:10

## 2023-01-03 RX ADMIN — Medication 975 MILLIGRAM(S): at 05:52

## 2023-01-03 RX ADMIN — ENOXAPARIN SODIUM 30 MILLIGRAM(S): 100 INJECTION SUBCUTANEOUS at 13:09

## 2023-01-03 RX ADMIN — ESCITALOPRAM OXALATE 10 MILLIGRAM(S): 10 TABLET, FILM COATED ORAL at 05:52

## 2023-01-03 RX ADMIN — Medication 975 MILLIGRAM(S): at 21:17

## 2023-01-03 NOTE — DIETITIAN INITIAL EVALUATION ADULT - ADD RECOMMEND
1) Monitor weights, PO intake/diet tolerance, skin integrity, pertinent labs.  2) Please consistently document % PO intake in nursing flowsheets to assess adequacy of nutritional intake/monitor need for further nutritional intervention(s).   3) Encourage PO intake, assist with meals PRN.  4) Consider further w/u of unintentional weight loss if medically indicated.

## 2023-01-03 NOTE — DIETITIAN INITIAL EVALUATION ADULT - PERTINENT LABORATORY DATA
01-03    142  |  108<H>  |  15  ----------------------------<  150<H>  4.3   |  23  |  0.75    Ca    9.0      03 Jan 2023 12:33  Phos  2.2     01-03  Mg     1.60     01-03

## 2023-01-03 NOTE — PROGRESS NOTE ADULT - SUBJECTIVE AND OBJECTIVE BOX
Patient is a 89y old  Female who presents with a chief complaint of left intertrochanteric femur fx (31 Dec 2022 22:20)      SUBJECTIVE / OVERNIGHT EVENTS: Pt states she is feeling well. Denies significant leg pain. Ambulating in halls with PT.    MEDICATIONS  (STANDING):  acetaminophen     Tablet .. 975 milliGRAM(s) Oral every 8 hours  chlorhexidine 2% Cloths 1 Application(s) Topical <User Schedule>  enoxaparin Injectable 30 milliGRAM(s) SubCutaneous every 24 hours  escitalopram 10 milliGRAM(s) Oral every 24 hours  famotidine    Tablet 20 milliGRAM(s) Oral every 24 hours  lactated ringers. 1000 milliLiter(s) (75 mL/Hr) IV Continuous <Continuous>  polyethylene glycol 3350 17 Gram(s) Oral daily  senna 2 Tablet(s) Oral at bedtime  sodium chloride 0.9%. 1000 milliLiter(s) (125 mL/Hr) IV Continuous <Continuous>    MEDICATIONS  (PRN):  bisacodyl Suppository 10 milliGRAM(s) Rectal daily PRN If no bowel movement by POD#2  magnesium hydroxide Suspension 30 milliLiter(s) Oral daily PRN Constipation  ondansetron Injectable 4 milliGRAM(s) IV Push every 6 hours PRN Nausea and/or Vomiting  oxyCODONE    IR 2.5 milliGRAM(s) Oral every 4 hours PRN Moderate Pain (4 - 6)  oxyCODONE    IR 5 milliGRAM(s) Oral every 4 hours PRN Severe Pain (7 - 10)      CAPILLARY BLOOD GLUCOSE        I&O's Summary    02 Jan 2023 07:01  -  03 Jan 2023 07:00  --------------------------------------------------------  IN: 0 mL / OUT: 600 mL / NET: -600 mL    03 Jan 2023 07:01  -  03 Jan 2023 12:29  --------------------------------------------------------  IN: 0 mL / OUT: 400 mL / NET: -400 mL        PHYSICAL EXAM:  Vital Signs Last 24 Hrs  T(C): 36.6 (03 Jan 2023 09:55), Max: 37.3 (03 Jan 2023 05:56)  T(F): 97.9 (03 Jan 2023 09:55), Max: 99.2 (03 Jan 2023 05:56)  HR: 99 (03 Jan 2023 09:55) (79 - 99)  BP: 125/57 (03 Jan 2023 09:55) (117/79 - 159/91)  BP(mean): --  RR: 17 (03 Jan 2023 09:55) (16 - 17)  SpO2: 100% (03 Jan 2023 09:55) (98% - 100%)    Parameters below as of 03 Jan 2023 09:55  Patient On (Oxygen Delivery Method): room air      CONSTITUTIONAL: NAD, well-developed, well-groomed  EYES: PERRLA; conjunctiva and sclera clear  ENMT: Moist oral mucosa, no pharyngeal injection or exudates; normal dentition  NECK: Supple, no palpable masses; no thyromegaly  RESPIRATORY: Normal respiratory effort; lungs are clear to auscultation bilaterally  CARDIOVASCULAR: Regular rate and rhythm, normal S1 and S2, no murmur/rub/gallop; No lower extremity edema; Peripheral pulses are 2+ bilaterally  ABDOMEN: Nontender to palpation, normoactive bowel sounds, no rebound/guarding; No hepatosplenomegaly  MUSCULOSKELETAL: no clubbing or cyanosis of digits; no joint swelling or tenderness to palpation  PSYCH: A+O to person, place, and time; affect appropriate  NEUROLOGY: CN 2-12 are intact and symmetric; no gross sensory deficits   SKIN: No rashes; no palpable lesions    LABS:                        9.5    8.82  )-----------( 186      ( 02 Jan 2023 05:50 )             28.8     01-02    143  |  108<H>  |  19  ----------------------------<  114<H>  4.7   |  27  |  0.84    Ca    8.7      02 Jan 2023 05:50                  RADIOLOGY & ADDITIONAL TESTS:  Results Reviewed:   Imaging Personally Reviewed:  Electrocardiogram Personally Reviewed:    COORDINATION OF CARE:  Care Discussed with Consultants/Other Providers [Y/N]:  Prior or Outpatient Records Reviewed [Y/N]:

## 2023-01-03 NOTE — DISCHARGE NOTE NURSING/CASE MANAGEMENT/SOCIAL WORK - NSDCPNINST_GEN_ALL_CORE
Make a follow up appointment with Dr. Odell. Call MD if you develop a fever, or if there is redness, swelling, drainage or pain not relieved by pain medication. No heavy lifting, bending, or straining to move your bowels. Take over the counter stool softeners as needed to prevent constipation which may be caused by pain medication.

## 2023-01-03 NOTE — DIETITIAN INITIAL EVALUATION ADULT - PERTINENT MEDS FT
MEDICATIONS  (STANDING):  acetaminophen     Tablet .. 975 milliGRAM(s) Oral every 8 hours  chlorhexidine 2% Cloths 1 Application(s) Topical <User Schedule>  enoxaparin Injectable 30 milliGRAM(s) SubCutaneous every 24 hours  escitalopram 10 milliGRAM(s) Oral every 24 hours  famotidine    Tablet 20 milliGRAM(s) Oral every 24 hours  lactated ringers. 1000 milliLiter(s) (75 mL/Hr) IV Continuous <Continuous>  polyethylene glycol 3350 17 Gram(s) Oral daily  senna 2 Tablet(s) Oral at bedtime  sodium chloride 0.9%. 1000 milliLiter(s) (125 mL/Hr) IV Continuous <Continuous>    MEDICATIONS  (PRN):  bisacodyl Suppository 10 milliGRAM(s) Rectal daily PRN If no bowel movement by POD#2  magnesium hydroxide Suspension 30 milliLiter(s) Oral daily PRN Constipation  ondansetron Injectable 4 milliGRAM(s) IV Push every 6 hours PRN Nausea and/or Vomiting  oxyCODONE    IR 2.5 milliGRAM(s) Oral every 4 hours PRN Moderate Pain (4 - 6)  oxyCODONE    IR 5 milliGRAM(s) Oral every 4 hours PRN Severe Pain (7 - 10)

## 2023-01-03 NOTE — DISCHARGE NOTE NURSING/CASE MANAGEMENT/SOCIAL WORK - PATIENT PORTAL LINK FT
You can access the FollowMyHealth Patient Portal offered by [facility] by registering at the following website: [email or web address]. By joining "[employer]’s FollowMyHealth portal, you will also be able to view your health information using other applications (apps) compatible with our system.

## 2023-01-03 NOTE — DISCHARGE NOTE NURSING/CASE MANAGEMENT/SOCIAL WORK - NSDPDISTO_GEN_ALL_CORE
Pt. is afebrile and offers no complaints. In no acute distress. Left hip dressing: clean, dry and intact. Pt is ambulating with a walker, tolerating diet well, and voiding in adequate amounts./Sub-Acute rehab Pt. is afebrile and offers no complaints. In no acute distress. Left hip dressing: clean, dry and intact. Pt is ambulating with a walker, tolerating diet well, and voiding in adequate amounts./Home with home care

## 2023-01-03 NOTE — DISCHARGE NOTE NURSING/CASE MANAGEMENT/SOCIAL WORK - NSSCTYPOFSERV_GEN_ALL_CORE
You have been accepted by [facility] for home care services (visiting nurse/PT). The agency will reach out to you for start of care.

## 2023-01-03 NOTE — PROGRESS NOTE ADULT - ATTENDING COMMENTS
Patient seen and examined. Ms. Ashford is a 89 year old female status post left hip IMN for left intertrochanteric hip fracture. No acute events overnight. Pain is currently well controlled with pain medication. Patient was able to walk well with PT yesterday.     Exam:  Patient Alert & Oriented x4  Dressing: Clean, Dry, Intact  Motor: Grossly intact EHL/FHL/Tibialis Anterior/Gastrocnemius  Sensory: Grossly intact superficial peroneal/deep peroneal/saphenous/sural/tibial nerves  Vascular: 2+ DP Pulse    Assessment/Plan:  Ms. Ashford is a 89 year old female status post left hip IMN for left intertrochanteric hip fracture, POD#1    Plan:  - Left Lower Extremity: Weight Bearing as Tolerated  - PT/OT, Out of Bed  - Pain Control: Per Pain Protocol  - DVT Prophylaxis: Lovenox 40mg daily  - Medicine Co-Management  - Disposition: Subacute Rehab Patient seen and examined. Ms. Ashford is a 89 year old female status post left hip IMN for left intertrochanteric hip fracture. No acute events overnight. Pain is currently well controlled with pain medication. Patient was able to walk well with PT yesterday.     Exam:  Patient Alert & Oriented x4  Dressing: Clean, Dry, Intact  Motor: Grossly intact EHL/FHL/Tibialis Anterior/Gastrocnemius  Sensory: Grossly intact superficial peroneal/deep peroneal/saphenous/sural/tibial nerves  Vascular: 2+ DP Pulse    Assessment/Plan:  Ms. Ashford is a 89 year old female status post left hip IMN for left intertrochanteric hip fracture, POD#2    Plan:  - Left Lower Extremity: Weight Bearing as Tolerated  - PT/OT, Out of Bed  - Pain Control: Per Pain Protocol  - DVT Prophylaxis: Lovenox 40mg daily  - Medicine Co-Management  - Disposition: Subacute Rehab

## 2023-01-03 NOTE — DIETITIAN INITIAL EVALUATION ADULT - OTHER INFO
RD visited with patient for BMI <19. Patient 89 year old female status post left hip IMN for left intertrochanteric hip fracture, POD#2. Patient reported to be tolerating diet (Regular) without issue, no reported GI distress i.e. nausea, vomiting, diarrhea.  No chewing or swallowing difficulties reported by patient.  In obtaining history, patient reported unintentional weight loss; stated weight was 125 pounds 3-4 months ago--->98 pounds just prior to hospitalization. Per patient, her usual doctor (PCP) is aware of the unintended weight loss. Current weight 1/2/22 - 44.8kg, consistent with reported usual body weight just prior to admission.  Weight loss of 27 pounds (12.3kg) / 22% in ~3-4 months.  Discussed nutritional strategies to optimize nutrition and to possibly help deter further weight loss, stated daughter purchased Ensure shakes for home; declines oral supplement i.e. Ensure at this time, stated is eating her meals w. good intake.

## 2023-01-03 NOTE — PROGRESS NOTE ADULT - SUBJECTIVE AND OBJECTIVE BOX
ORTHO PROGRESS NOTE     Pt seen and examined at bedside, denies SOB, CP, Dizziness. N/V/D /HA.  No significant overnight events. Pain well controlled.    Vital Signs Last 24 Hrs  T(C): 37.3 (03 Jan 2023 05:56), Max: 37.3 (03 Jan 2023 05:56)  T(F): 99.2 (03 Jan 2023 05:56), Max: 99.2 (03 Jan 2023 05:56)  HR: 87 (03 Jan 2023 05:56) (79 - 103)  BP: 159/91 (03 Jan 2023 05:56) (117/79 - 159/91)  BP(mean): --  RR: 17 (03 Jan 2023 05:56) (16 - 19)  SpO2: 98% (03 Jan 2023 05:56) (98% - 100%)    Parameters below as of 03 Jan 2023 05:56  Patient On (Oxygen Delivery Method): room air        Gen: NAD, alert and oriented  Resp: Unlabored breathing  LLE: Dressing c/d/i       SILT DP/SP/ Michelle/Saph/tib       5/5 EHL 5/5 FHL 5/5 TA 5/5 Gastroc 5/5 IP        DP+,        soft compartments, no calf ttp,       Labs:  CBC Full  -  ( 02 Jan 2023 05:50 )  WBC Count : 8.82 K/uL  RBC Count : 3.25 M/uL  Hemoglobin : 9.5 g/dL  Hematocrit : 28.8 %  Platelet Count - Automated : 186 K/uL  Mean Cell Volume : 88.6 fL  Mean Cell Hemoglobin : 29.2 pg  Mean Cell Hemoglobin Concentration : 33.0 gm/dL  Auto Neutrophil # : 6.77 K/uL  Auto Lymphocyte # : 1.14 K/uL  Auto Monocyte # : 0.75 K/uL  Auto Eosinophil # : 0.09 K/uL  Auto Basophil # : 0.03 K/uL  Auto Neutrophil % : 76.8 %  Auto Lymphocyte % : 12.9 %  Auto Monocyte % : 8.5 %  Auto Eosinophil % : 1.0 %  Auto Basophil % : 0.3 %      01-02    143  |  108<H>  |  19  ----------------------------<  114<H>  4.7   |  27  |  0.84    Ca    8.7      02 Jan 2023 05:50        A/P  Pt is a 89y Female s/p L IMN     - Pain control/ Analgesia  - DVT ppx  -PT/OT   -WBAT

## 2023-01-03 NOTE — DISCHARGE NOTE NURSING/CASE MANAGEMENT/SOCIAL WORK - NSDCPEFALRISK_GEN_ALL_CORE
For information on Fall & Injury Prevention, visit: [email or web address] OR  [email or web address] OR  https://www.cdc.gov/steadi/patient.html

## 2023-01-04 PROCEDURE — 99233 SBSQ HOSP IP/OBS HIGH 50: CPT

## 2023-01-04 RX ADMIN — Medication 975 MILLIGRAM(S): at 21:31

## 2023-01-04 RX ADMIN — FAMOTIDINE 20 MILLIGRAM(S): 10 INJECTION INTRAVENOUS at 05:14

## 2023-01-04 RX ADMIN — Medication 975 MILLIGRAM(S): at 05:14

## 2023-01-04 RX ADMIN — ENOXAPARIN SODIUM 30 MILLIGRAM(S): 100 INJECTION SUBCUTANEOUS at 13:25

## 2023-01-04 RX ADMIN — ESCITALOPRAM OXALATE 10 MILLIGRAM(S): 10 TABLET, FILM COATED ORAL at 05:14

## 2023-01-04 RX ADMIN — Medication 975 MILLIGRAM(S): at 13:24

## 2023-01-04 NOTE — PROVIDER CONTACT NOTE (OTHER) - ACTION/TREATMENT ORDERED:
Earnest  [address]  [phone]               Thank you for choosing us for your health care visit with Shonna Calvert MD.  We are glad to serve you and happy to provide you with this summar information, go to [email or web address] and click on the Sign Up Now link in the [employer] box. Enter your [employer] Activation Code exactly as it appears below along with your Zip Code and Date of Birth to complete the sign-up process.  If you do You don’t need to join a gym. Home exercises work great.  Put more priority on exercise in your life                    Visit [facility] online at  [email or web address] Acp to assess pt

## 2023-01-04 NOTE — PROGRESS NOTE ADULT - SUBJECTIVE AND OBJECTIVE BOX
Pt seen/examined. Doing well. Pain controlled. No acute overnight complaints or events.     T(C): 37 (01-04-23 @ 05:51), Max: 37.3 (01-03-23 @ 21:53)  HR: 77 (01-04-23 @ 05:51) (77 - 99)  BP: 131/53 (01-04-23 @ 05:51) (125/57 - 141/53)  RR: 18 (01-04-23 @ 05:51) (17 - 18)  SpO2: 99% (01-04-23 @ 05:51) (98% - 100%)  Wt(kg): --    Gen: awake, alert, NAD  Resp: no increased work of breathing  LLE:  Dressing c/d/i  +EHL/FHL/TA/GS  SILT S/S/SP/DP  +DP Pulses  Compartments soft  No calf TTP                         9.4    10.27 )-----------( 222      ( 03 Jan 2023 12:33 )             28.6       01-03    142  |  108<H>  |  15  ----------------------------<  150<H>  4.3   |  23  |  0.75          A/P: 89yo Female s/p L Hip IMN, POD3    - Pain control  - mechanical/DVT ppx  - OOB/PT/OT  - WBAT LLE  - Dispo planning: rehab

## 2023-01-04 NOTE — PROVIDER CONTACT NOTE (OTHER) - ASSESSMENT
VSS, left hip soft, no evidence of hematoma
bp 147/61 at 1700. Pt eating dinner. no s/s of distress.

## 2023-01-04 NOTE — PROGRESS NOTE ADULT - SUBJECTIVE AND OBJECTIVE BOX
Patient is a 89y old  Female who presents with a chief complaint of Fracture of unspecified part of neck of unspecified femur, initial encounter for closed fracture     (03 Jan 2023 14:46)      SUBJECTIVE / OVERNIGHT EVENTS: Pt feeling well without complaint. Ambulating with PT. Pain controlled.    MEDICATIONS  (STANDING):  acetaminophen     Tablet .. 975 milliGRAM(s) Oral every 8 hours  enoxaparin Injectable 30 milliGRAM(s) SubCutaneous every 24 hours  escitalopram 10 milliGRAM(s) Oral every 24 hours  famotidine    Tablet 20 milliGRAM(s) Oral every 24 hours  lactated ringers. 1000 milliLiter(s) (75 mL/Hr) IV Continuous <Continuous>  polyethylene glycol 3350 17 Gram(s) Oral daily  senna 2 Tablet(s) Oral at bedtime  sodium chloride 0.9%. 1000 milliLiter(s) (125 mL/Hr) IV Continuous <Continuous>    MEDICATIONS  (PRN):  bisacodyl Suppository 10 milliGRAM(s) Rectal daily PRN If no bowel movement by POD#2  magnesium hydroxide Suspension 30 milliLiter(s) Oral daily PRN Constipation  ondansetron Injectable 4 milliGRAM(s) IV Push every 6 hours PRN Nausea and/or Vomiting  oxyCODONE    IR 2.5 milliGRAM(s) Oral every 4 hours PRN Moderate Pain (4 - 6)  oxyCODONE    IR 5 milliGRAM(s) Oral every 4 hours PRN Severe Pain (7 - 10)      CAPILLARY BLOOD GLUCOSE        I&O's Summary    03 Jan 2023 07:01  -  04 Jan 2023 07:00  --------------------------------------------------------  IN: 0 mL / OUT: 400 mL / NET: -400 mL        PHYSICAL EXAM:  Vital Signs Last 24 Hrs  T(C): 37.2 (04 Jan 2023 09:16), Max: 37.3 (03 Jan 2023 21:53)  T(F): 98.9 (04 Jan 2023 09:16), Max: 99.2 (03 Jan 2023 21:53)  HR: 87 (04 Jan 2023 09:16) (77 - 91)  BP: 128/57 (04 Jan 2023 09:16) (128/57 - 141/53)  BP(mean): --  RR: 18 (04 Jan 2023 09:16) (17 - 18)  SpO2: 97% (04 Jan 2023 09:16) (97% - 100%)    Parameters below as of 04 Jan 2023 09:16  Patient On (Oxygen Delivery Method): room air      CONSTITUTIONAL: thin older woman, NAD  EYES: PERRLA; conjunctiva and sclera clear  ENMT: Moist oral mucosa, no pharyngeal injection or exudates; normal dentition  NECK: Supple, no palpable masses; no thyromegaly  RESPIRATORY: Normal respiratory effort; lungs are clear to auscultation bilaterally  CARDIOVASCULAR: Regular rate and rhythm, normal S1 and S2, no murmur/rub/gallop; No lower extremity edema; Peripheral pulses are 2+ bilaterally  ABDOMEN: Nontender to palpation, normoactive bowel sounds, no rebound/guarding; No hepatosplenomegaly  MUSCULOSKELETAL: no clubbing or cyanosis of digits; no joint swelling or tenderness to palpation  PSYCH: responds to name, answering questions appropriately  NEUROLOGY: CN 2-12 are intact and symmetric; no gross sensory deficits   SKIN: No rashes; no palpable lesions    LABS:                        9.4    10.27 )-----------( 222      ( 03 Jan 2023 12:33 )             28.6     01-03    142  |  108<H>  |  15  ----------------------------<  150<H>  4.3   |  23  |  0.75    Ca    9.0      03 Jan 2023 12:33  Phos  2.2     01-03  Mg     1.60     01-03                  RADIOLOGY & ADDITIONAL TESTS:  Results Reviewed:   Imaging Personally Reviewed:  Electrocardiogram Personally Reviewed:    COORDINATION OF CARE:  Care Discussed with Consultants/Other Providers [Y/N]:  Prior or Outpatient Records Reviewed [Y/N]:

## 2023-01-05 PROCEDURE — 99233 SBSQ HOSP IP/OBS HIGH 50: CPT

## 2023-01-05 RX ADMIN — Medication 975 MILLIGRAM(S): at 21:22

## 2023-01-05 RX ADMIN — Medication 975 MILLIGRAM(S): at 13:34

## 2023-01-05 RX ADMIN — Medication 975 MILLIGRAM(S): at 05:20

## 2023-01-05 RX ADMIN — ENOXAPARIN SODIUM 30 MILLIGRAM(S): 100 INJECTION SUBCUTANEOUS at 13:34

## 2023-01-05 RX ADMIN — ESCITALOPRAM OXALATE 10 MILLIGRAM(S): 10 TABLET, FILM COATED ORAL at 05:20

## 2023-01-05 RX ADMIN — FAMOTIDINE 20 MILLIGRAM(S): 10 INJECTION INTRAVENOUS at 05:20

## 2023-01-05 RX ADMIN — SENNA PLUS 2 TABLET(S): 8.6 TABLET ORAL at 21:22

## 2023-01-05 NOTE — PROGRESS NOTE ADULT - SUBJECTIVE AND OBJECTIVE BOX
ORTHO PROGRESS NOTE     Pt seen and examined at bedside, denies SOB, CP, Dizziness. N/V/D /HA.  No significant overnight events. Pain well controlled. Patient ambulated  with PT     Vital Signs Last 24 Hrs  T(C): 37 (05 Jan 2023 05:24), Max: 37.2 (04 Jan 2023 09:16)  T(F): 98.6 (05 Jan 2023 05:24), Max: 98.9 (04 Jan 2023 09:16)  HR: 86 (05 Jan 2023 05:24) (86 - 92)  BP: 133/71 (05 Jan 2023 05:24) (128/57 - 147/61)  BP(mean): --  RR: 18 (05 Jan 2023 05:24) (18 - 18)  SpO2: 97% (05 Jan 2023 05:24) (97% - 100%)    Parameters below as of 05 Jan 2023 05:24  Patient On (Oxygen Delivery Method): room air        Gen: No apparent distress    left LE:  Dressing C/D I    BLE: motor intact EHL/FHL/TA/GS .  Sensation is grossly intact to light touch in the distal extremities.    Compartments are soft bilaterally.  Extremities are warm .  DP 2+ BLE     Labs:  CBC Full  -  ( 03 Jan 2023 12:33 )  WBC Count : 10.27 K/uL  RBC Count : 3.21 M/uL  Hemoglobin : 9.4 g/dL  Hematocrit : 28.6 %  Platelet Count - Automated : 222 K/uL  Mean Cell Volume : 89.1 fL  Mean Cell Hemoglobin : 29.3 pg  Mean Cell Hemoglobin Concentration : 32.9 gm/dL  Auto Neutrophil # : x  Auto Lymphocyte # : x  Auto Monocyte # : x  Auto Eosinophil # : x  Auto Basophil # : x  Auto Neutrophil % : x  Auto Lymphocyte % : x  Auto Monocyte % : x  Auto Eosinophil % : x  Auto Basophil % : x        01-03    142  |  108<H>  |  15  ----------------------------<  150<H>  4.3   |  23  |  0.75    Ca    9.0      03 Jan 2023 12:33  Phos  2.2     01-03  Mg     1.60     01-03           A/P  s/p left hip IMN  POD #4    - Pain control/ Analgesia  - DVT ppx Lovenox,, foot pumps  - PT/OT - wbat/oob    - Incentive Spirometer  - Rehab planning   - notify Ortho for questions

## 2023-01-05 NOTE — PROGRESS NOTE ADULT - SUBJECTIVE AND OBJECTIVE BOX
Patient is a 89y old  Female who presents with a chief complaint of Fracture of unspecified part of neck of unspecified femur, initial encounter for closed fracture     (03 Jan 2023 14:46)      SUBJECTIVE / OVERNIGHT EVENTS: Pt doing well without complaint. Pain controlled. Tolerating diet. Working with PT.    MEDICATIONS  (STANDING):  acetaminophen     Tablet .. 975 milliGRAM(s) Oral every 8 hours  enoxaparin Injectable 30 milliGRAM(s) SubCutaneous every 24 hours  escitalopram 10 milliGRAM(s) Oral every 24 hours  famotidine    Tablet 20 milliGRAM(s) Oral every 24 hours  lactated ringers. 1000 milliLiter(s) (75 mL/Hr) IV Continuous <Continuous>  polyethylene glycol 3350 17 Gram(s) Oral daily  senna 2 Tablet(s) Oral at bedtime  sodium chloride 0.9%. 1000 milliLiter(s) (125 mL/Hr) IV Continuous <Continuous>    MEDICATIONS  (PRN):  bisacodyl Suppository 10 milliGRAM(s) Rectal daily PRN If no bowel movement by POD#2  magnesium hydroxide Suspension 30 milliLiter(s) Oral daily PRN Constipation  ondansetron Injectable 4 milliGRAM(s) IV Push every 6 hours PRN Nausea and/or Vomiting  oxyCODONE    IR 2.5 milliGRAM(s) Oral every 4 hours PRN Moderate Pain (4 - 6)  oxyCODONE    IR 5 milliGRAM(s) Oral every 4 hours PRN Severe Pain (7 - 10)      CAPILLARY BLOOD GLUCOSE        I&O's Summary      PHYSICAL EXAM:  Vital Signs Last 24 Hrs  T(C): 36.3 (05 Jan 2023 09:59), Max: 37.1 (04 Jan 2023 17:00)  T(F): 97.3 (05 Jan 2023 09:59), Max: 98.7 (04 Jan 2023 17:00)  HR: 85 (05 Jan 2023 09:59) (85 - 92)  BP: 129/56 (05 Jan 2023 09:59) (129/56 - 147/61)  BP(mean): --  RR: 18 (05 Jan 2023 09:59) (18 - 18)  SpO2: 99% (05 Jan 2023 09:59) (97% - 100%)    Parameters below as of 05 Jan 2023 09:59  Patient On (Oxygen Delivery Method): room air      CONSTITUTIONAL: thin older woman, NAD  EYES: PERRLA; conjunctiva and sclera clear  ENMT: Moist oral mucosa, no pharyngeal injection or exudates; normal dentition  NECK: Supple, no palpable masses; no thyromegaly  RESPIRATORY: Normal respiratory effort; lungs are clear to auscultation bilaterally  CARDIOVASCULAR: Regular rate and rhythm, normal S1 and S2, no murmur/rub/gallop; No lower extremity edema; Peripheral pulses are 2+ bilaterally  ABDOMEN: Nontender to palpation, normoactive bowel sounds, no rebound/guarding; No hepatosplenomegaly  MUSCULOSKELETAL: no clubbing or cyanosis of digits; no joint swelling or tenderness to palpation  PSYCH: calm, responds to name, answering questions appropriately  NEUROLOGY: CN 2-12 are intact and symmetric; no gross sensory deficits   SKIN: No rashes; no palpable lesions    LABS:                        9.4    10.27 )-----------( 222      ( 03 Jan 2023 12:33 )             28.6     01-03    142  |  108<H>  |  15  ----------------------------<  150<H>  4.3   |  23  |  0.75    Ca    9.0      03 Jan 2023 12:33  Phos  2.2     01-03  Mg     1.60     01-03                  RADIOLOGY & ADDITIONAL TESTS:  Results Reviewed:   Imaging Personally Reviewed:  Electrocardiogram Personally Reviewed:    COORDINATION OF CARE:  Care Discussed with Consultants/Other Providers [Y/N]:  Prior or Outpatient Records Reviewed [Y/N]:

## 2023-01-06 ENCOUNTER — TRANSCRIPTION ENCOUNTER (OUTPATIENT)
Age: 88
End: 2023-01-06

## 2023-01-06 PROCEDURE — 99233 SBSQ HOSP IP/OBS HIGH 50: CPT

## 2023-01-06 RX ORDER — FAMOTIDINE 10 MG/ML
20 INJECTION INTRAVENOUS
Qty: 0 | Refills: 0 | DISCHARGE

## 2023-01-06 RX ORDER — APIXABAN 2.5 MG/1
1 TABLET, FILM COATED ORAL
Qty: 60 | Refills: 0
Start: 2023-01-06 | End: 2023-02-04

## 2023-01-06 RX ORDER — RIVAROXABAN 15 MG-20MG
1 KIT ORAL
Qty: 42 | Refills: 0
Start: 2023-01-06 | End: 2023-02-16

## 2023-01-06 RX ORDER — SENNA PLUS 8.6 MG/1
2 TABLET ORAL
Qty: 28 | Refills: 0
Start: 2023-01-06 | End: 2023-01-19

## 2023-01-06 RX ORDER — FAMOTIDINE 10 MG/ML
1 INJECTION INTRAVENOUS
Qty: 30 | Refills: 0
Start: 2023-01-06 | End: 2023-02-04

## 2023-01-06 RX ORDER — OXYCODONE HYDROCHLORIDE 5 MG/1
1 TABLET ORAL
Qty: 42 | Refills: 0
Start: 2023-01-06 | End: 2023-01-12

## 2023-01-06 RX ORDER — APIXABAN 2.5 MG/1
2.5 TABLET, FILM COATED ORAL EVERY 12 HOURS
Refills: 0 | Status: DISCONTINUED | OUTPATIENT
Start: 2023-01-07 | End: 2023-01-07

## 2023-01-06 RX ADMIN — Medication 975 MILLIGRAM(S): at 07:02

## 2023-01-06 RX ADMIN — SENNA PLUS 2 TABLET(S): 8.6 TABLET ORAL at 21:24

## 2023-01-06 RX ADMIN — Medication 975 MILLIGRAM(S): at 21:24

## 2023-01-06 RX ADMIN — ESCITALOPRAM OXALATE 10 MILLIGRAM(S): 10 TABLET, FILM COATED ORAL at 07:02

## 2023-01-06 RX ADMIN — ENOXAPARIN SODIUM 30 MILLIGRAM(S): 100 INJECTION SUBCUTANEOUS at 13:22

## 2023-01-06 RX ADMIN — Medication 975 MILLIGRAM(S): at 13:22

## 2023-01-06 RX ADMIN — FAMOTIDINE 20 MILLIGRAM(S): 10 INJECTION INTRAVENOUS at 07:02

## 2023-01-06 NOTE — DISCHARGE NOTE PROVIDER - NSDCMRMEDTOKEN_GEN_ALL_CORE_FT
escitalopram 10 mg oral tablet: 1 tab(s) orally once a day  famotidine 20 mg oral tablet: 20 milligram(s) orally once a day   apixaban 5 mg oral tablet: 1 tab(s) orally 2 times a day MDD:2  escitalopram 10 mg oral tablet: 1 tab(s) orally once a day  famotidine 20 mg oral tablet: 1 tab(s) orally once a day MDD:1   oxyCODONE 5 mg oral tablet: 0.5- 1 tab(s) orally every 4 hours, As needed, Severe Pain (7 - 10) MDD:3  senna leaf extract oral tablet: 2 tab(s) orally once a day (at bedtime) MDD:2   apixaban 2.5 mg oral tablet: 1 tab(s) orally every 12 hours MDD:2  escitalopram 10 mg oral tablet: 1 tab(s) orally once a day  famotidine 20 mg oral tablet: 1 tab(s) orally once a day MDD:1   oxyCODONE 5 mg oral tablet: 0.5- 1 tab(s) orally every 4 hours, As needed, Severe Pain (7 - 10) MDD:3  senna leaf extract oral tablet: 2 tab(s) orally once a day (at bedtime) MDD:2   escitalopram 10 mg oral tablet: 1 tab(s) orally once a day  famotidine 20 mg oral tablet: 1 tab(s) orally once a day MDD:1   oxyCODONE 5 mg oral tablet: 0.5- 1 tab(s) orally every 4 hours, As needed, Severe Pain (7 - 10) MDD:3  senna leaf extract oral tablet: 2 tab(s) orally once a day (at bedtime) MDD:2

## 2023-01-06 NOTE — PROGRESS NOTE ADULT - SUBJECTIVE AND OBJECTIVE BOX
Ortho Progress Note    S: Patient seen and examined. No acute events overnight. Pain well controlled with current regimen. Denies lightheadedness/dizziness, CP/SOB. Tolerating diet.       O:  Physical Exam:  Gen: Laying in bed, NAD, alert and oriented.   Resp: Unlabored breathing  LLExt: EHL/FHL/TA/Sol intact          + SILT DP/SP/DUENAS/Sa/T          +DP, extremity WWP    Vital Signs Last 24 Hrs  T(C): 36.9 (05 Jan 2023 21:34), Max: 37 (05 Jan 2023 17:33)  T(F): 98.4 (05 Jan 2023 21:34), Max: 98.6 (05 Jan 2023 17:33)  HR: 89 (05 Jan 2023 21:34) (85 - 89)  BP: 144/55 (05 Jan 2023 21:34) (128/52 - 144/55)  BP(mean): --  RR: 18 (05 Jan 2023 21:34) (18 - 18)  SpO2: 100% (05 Jan 2023 21:34) (99% - 100%)    Parameters below as of 05 Jan 2023 21:34  Patient On (Oxygen Delivery Method): room air                           Ortho Progress Note    S: Patient seen and examined. No acute events overnight. Pain well controlled with current regimen. Denies lightheadedness/dizziness, CP/SOB. Tolerating diet.       O:  Physical Exam:  Gen: Laying in bed, NAD, alert and oriented.   Resp: Unlabored breathing  LLExt:   Dressings c/d/i  EHL/FHL/TA/Sol intact          + SILT DP/SP/DUENAS/Sa/T          +DP, extremity WWP    Vital Signs Last 24 Hrs  T(C): 36.9 (05 Jan 2023 21:34), Max: 37 (05 Jan 2023 17:33)  T(F): 98.4 (05 Jan 2023 21:34), Max: 98.6 (05 Jan 2023 17:33)  HR: 89 (05 Jan 2023 21:34) (85 - 89)  BP: 144/55 (05 Jan 2023 21:34) (128/52 - 144/55)  BP(mean): --  RR: 18 (05 Jan 2023 21:34) (18 - 18)  SpO2: 100% (05 Jan 2023 21:34) (99% - 100%)    Parameters below as of 05 Jan 2023 21:34  Patient On (Oxygen Delivery Method): room air

## 2023-01-06 NOTE — DISCHARGE NOTE PROVIDER - HOSPITAL COURSE
89 Pt is s/p IMN left hip  without any intraoperative complications.  Pt is doing well and stable for discharge.  Pt is tolerating physical therapy: WBAT with cane/walker,  gait training.  Leave dressing on until post op office visit(POD#14).  Have sutures/staples removed POD#14 if present.  Pt is on                 for DVT prophylaxis take for 6 weeks unless otherwise directed by surgeon.  follow up with Dr. Odell in two weeks.  Follow up with primary care doctor in 1-2 weeks for continuity of care.  The patient had no post-operative complications and clinically progressed faster than anticipated.  The patient was safely and appropriately discharged home. 89 Pt is s/p IMN left hip  without any intraoperative complications.  Pt is doing well and stable for discharge.  Pt is tolerating physical therapy: WBAT with cane/walker,  gait training.  Leave dressing on until post op office visit(POD#14).  Have sutures/staples removed POD#14 if present.  Pt is on Eliquis 5mg po bid for DVT prophylaxis take for 6 weeks unless otherwise directed by surgeon.  follow up with Dr. Odell in two weeks.  Follow up with primary care doctor in 1-2 weeks for continuity of care.  The patient had no post-operative complications and clinically progressed faster than anticipated.  The patient was safely and appropriately discharged home. 89 y/o Female presents to [facility] s/p mechanical fall, found to have a left intertrochanteric fracture. Patient s/p left femur intramedullary nailing with Dr. Odell on 1/1/23. Patient tolerated the procedure well without any intraoperative complications. Patient tolerated physical therapy well, and the pain was controlled. Pt is weight bearing as tolerated with cane/walker as needed. Seen by medical attending for continuity of care and management and cleared for safe discharge. Keep dressing/incision clean, dry and intact. Any suture/staples to be removed on post-op day #14 your office visit. Pt is on Eliquis 5mg BID for DVT prophylaxis, please take for 6 weeks unless otherwise instructed by your surgeon. Please follow up with Dr. Odell in 2 weeks, call the office to make an appointment, [phone]. Please follow up with your PMD for continuity of care and management as medications may have changed.            89 y/o Female presents to [facility] s/p mechanical fall, found to have a left intertrochanteric fracture. Patient s/p left femur intramedullary nailing with Dr. Odell on 1/1/23. Patient tolerated the procedure well without any intraoperative complications. Patient tolerated physical therapy well, and the pain was controlled. Pt is weight bearing as tolerated with cane/walker as needed. Seen by medical attending for continuity of care and management and cleared for safe discharge. Keep dressing/incision clean, dry and intact. Any suture/staples to be removed on post-op day #14 your office visit. Pt is on Eliquis 2.5mg BID for DVT prophylaxis, please take for 6 weeks unless otherwise instructed by your surgeon. Please follow up with Dr. Odell in 2 weeks, call the office to make an appointment, [phone]. Please follow up with your PMD for continuity of care and management as medications may have changed.            89 y/o Female presents to [facility] s/p mechanical fall, found to have a left intertrochanteric fracture. Patient s/p left femur intramedullary nailing with Dr. Odell on 1/1/23. Patient tolerated the procedure well without any intraoperative complications. Patient tolerated physical therapy well, and the pain was controlled. Pt is weight bearing as tolerated with cane/walker as needed. Seen by medical attending for continuity of care and management and cleared for safe discharge. Keep dressing/incision clean, dry and intact. Any suture/staples to be removed on post-op day #14 your office visit. Pt is on Eliquis 5mg BID for DVT prophylaxis, please take for 4 weeks unless otherwise instructed by your surgeon. Please follow up with Dr. Odell in 2 weeks, call the office to make an appointment, [phone]. Please follow up with your PMD for continuity of care and management as medications may have changed.

## 2023-01-06 NOTE — PROGRESS NOTE ADULT - ATTENDING COMMENTS
Patient seen and examined. Ms. Ashford is a 89 year old female status post left hip IMN for left intertrochanteric hip fracture. No acute events overnight. Pain is currently well controlled with pain medication. Patient was able to walk well with PT yesterday.     Exam:  Patient Alert & Oriented x4  Dressing: Clean, Dry, Intact  Motor: Grossly intact EHL/FHL/Tibialis Anterior/Gastrocnemius  Sensory: Grossly intact superficial peroneal/deep peroneal/saphenous/sural/tibial nerves  Vascular: 2+ DP Pulse    Assessment/Plan:  Ms. Ashford is a 89 year old female status post left hip IMN for left intertrochanteric hip fracture    Plan:  - Left Lower Extremity: Weight Bearing as Tolerated  - PT/OT, Out of Bed  - Pain Control: Per Pain Protocol  - DVT Prophylaxis: Lovenox 40mg daily  - Medicine Co-Management  - Disposition: Pending

## 2023-01-06 NOTE — DISCHARGE NOTE PROVIDER - PROVIDER TOKENS
PROVIDER:[TOKEN:[[id number]],ESTABLISHEDPATIENT:[T]] PROVIDER:[TOKEN:[[id number]],FOLLOWUP:[2 weeks],ESTABLISHEDPATIENT:[T]]

## 2023-01-06 NOTE — DISCHARGE NOTE PROVIDER - NSDCCPTREATMENT_GEN_ALL_CORE_FT
PRINCIPAL PROCEDURE  Procedure: ORIF, hip, with intramedullary garrett  Findings and Treatment:        PRINCIPAL PROCEDURE  Procedure: ORIF, hip, with intramedullary garrett  Findings and Treatment: 89 y/o Female presents to [facility] s/p mechanical fall, found to have a left intertrochanteric fracture. Patient s/p left femur intramedullary nailing with Dr. Odell on 1/1/23. Patient tolerated the procedure well without any intraoperative complications. Patient tolerated physical therapy well, and the pain was controlled. Pt is weight bearing as tolerated with cane/walker as needed. Seen by medical attending for continuity of care and management and cleared for safe discharge. Keep dressing/incision clean, dry and intact. Any suture/staples to be removed on post-op day #14 your office visit. Pt is on Eliquis 5mg BID for DVT prophylaxis, please take for 6 weeks unless otherwise instructed by your surgeon. Please follow up with Dr. Odell in 2 weeks, call the office to make an appointment, [phone]. Please follow up with your PMD for continuity of care and management as medications may have changed.       PRINCIPAL PROCEDURE  Procedure: ORIF, hip, with intramedullary garrett  Findings and Treatment: 89 y/o Female presents to [facility] s/p mechanical fall, found to have a left intertrochanteric fracture. Patient s/p left femur intramedullary nailing with Dr. Odell on 1/1/23. Patient tolerated the procedure well without any intraoperative complications. Patient tolerated physical therapy well, and the pain was controlled. Pt is weight bearing as tolerated with cane/walker as needed. Seen by medical attending for continuity of care and management and cleared for safe discharge. Keep dressing/incision clean, dry and intact. Any suture/staples to be removed on post-op day #14 your office visit. Pt is on Eliquis 2.5mg BID for DVT prophylaxis, please take for 6 weeks unless otherwise instructed by your surgeon. Please follow up with Dr. Odell in 2 weeks, call the office to make an appointment, [phone]. Please follow up with your PMD for continuity of care and management as medications may have changed.       PRINCIPAL PROCEDURE  Procedure: ORIF, hip, with intramedullary garrett  Findings and Treatment: 89 y/o Female presents to [facility] s/p mechanical fall, found to have a left intertrochanteric fracture. Patient s/p left femur intramedullary nailing with Dr. Odell on 1/1/23. Patient tolerated the procedure well without any intraoperative complications. Patient tolerated physical therapy well, and the pain was controlled. Pt is weight bearing as tolerated with cane/walker as needed. Seen by medical attending for continuity of care and management and cleared for safe discharge. Keep dressing/incision clean, dry and intact. Any suture/staples to be removed on post-op day #14 your office visit. Pt is on Eliquis 5mg BID for DVT prophylaxis, please take for 4 weeks unless otherwise instructed by your surgeon. Please follow up with Dr. Odell in 2 weeks, call the office to make an appointment, [phone]. Please follow up with your PMD for continuity of care and management as medications may have changed.

## 2023-01-06 NOTE — DISCHARGE NOTE PROVIDER - DETAILS OF MALNUTRITION DIAGNOSIS/DIAGNOSES
This patient has been assessed with a concern for Malnutrition and was treated during this hospitalization for the following Nutrition diagnosis/diagnoses:     -  01/03/2023: Severe protein-calorie malnutrition   -  01/03/2023: Underweight (BMI < 19)

## 2023-01-06 NOTE — PROGRESS NOTE ADULT - SUBJECTIVE AND OBJECTIVE BOX
Patient is a 89y old  Female who presents with a chief complaint of left intertrochanteric hip fracture -> left hip IMN 12/31/22 (06 Jan 2023 11:20)      SUBJECTIVE / OVERNIGHT EVENTS: No overnight event. Pain is controlled. No acute complaint.    MEDICATIONS  (STANDING):  acetaminophen     Tablet .. 975 milliGRAM(s) Oral every 8 hours  enoxaparin Injectable 30 milliGRAM(s) SubCutaneous every 24 hours  escitalopram 10 milliGRAM(s) Oral every 24 hours  famotidine    Tablet 20 milliGRAM(s) Oral every 24 hours  lactated ringers. 1000 milliLiter(s) (75 mL/Hr) IV Continuous <Continuous>  polyethylene glycol 3350 17 Gram(s) Oral daily  senna 2 Tablet(s) Oral at bedtime  sodium chloride 0.9%. 1000 milliLiter(s) (125 mL/Hr) IV Continuous <Continuous>    MEDICATIONS  (PRN):  bisacodyl Suppository 10 milliGRAM(s) Rectal daily PRN If no bowel movement by POD#2  magnesium hydroxide Suspension 30 milliLiter(s) Oral daily PRN Constipation  ondansetron Injectable 4 milliGRAM(s) IV Push every 6 hours PRN Nausea and/or Vomiting  oxyCODONE    IR 2.5 milliGRAM(s) Oral every 4 hours PRN Moderate Pain (4 - 6)  oxyCODONE    IR 5 milliGRAM(s) Oral every 4 hours PRN Severe Pain (7 - 10)      CAPILLARY BLOOD GLUCOSE        I&O's Summary      PHYSICAL EXAM:  Vital Signs Last 24 Hrs  T(C): 36.6 (06 Jan 2023 09:03), Max: 37 (05 Jan 2023 17:33)  T(F): 97.8 (06 Jan 2023 09:03), Max: 98.6 (05 Jan 2023 17:33)  HR: 85 (06 Jan 2023 09:03) (85 - 89)  BP: 144/64 (06 Jan 2023 09:03) (128/52 - 144/64)  BP(mean): --  RR: 18 (06 Jan 2023 09:03) (18 - 18)  SpO2: 99% (06 Jan 2023 09:03) (99% - 100%)    Parameters below as of 06 Jan 2023 09:03  Patient On (Oxygen Delivery Method): room air      CONSTITUTIONAL: thin older woman NAD  EYES: PERRLA; conjunctiva and sclera clear  ENMT: Moist oral mucosa, no pharyngeal injection or exudates; normal dentition  NECK: Supple, no palpable masses; no thyromegaly  RESPIRATORY: Normal respiratory effort; lungs are clear to auscultation bilaterally  CARDIOVASCULAR: Regular rate and rhythm, normal S1 and S2, no murmur/rub/gallop; No lower extremity edema; Peripheral pulses are 2+ bilaterally  ABDOMEN: Nontender to palpation, normoactive bowel sounds, no rebound/guarding; No hepatosplenomegaly  MUSCULOSKELETAL: no clubbing or cyanosis of digits; no joint swelling or tenderness to palpation  PSYCH: calm and answering questions appropriately  NEUROLOGY: CN 2-12 are intact and symmetric; no gross sensory deficits   SKIN: No rashes; no palpable lesions

## 2023-01-06 NOTE — DISCHARGE NOTE PROVIDER - CARE PROVIDER_API CALL
Jayme Odell)  Orthopedics  [address] [place] [address]  Phone: [phone]  Fax: [phone]  Established Patient  Follow Up Time:    Jayme Odell)  Orthopedics  [address] [place] [address]  Phone: [phone]  Fax: [phone]  Established Patient  Follow Up Time: 2 weeks

## 2023-01-07 VITALS
SYSTOLIC BLOOD PRESSURE: 121 MMHG | DIASTOLIC BLOOD PRESSURE: 61 MMHG | RESPIRATION RATE: 18 BRPM | HEART RATE: 90 BPM | TEMPERATURE: 99 F | OXYGEN SATURATION: 99 %

## 2023-01-07 PROCEDURE — 99232 SBSQ HOSP IP/OBS MODERATE 35: CPT

## 2023-01-07 RX ADMIN — ESCITALOPRAM OXALATE 10 MILLIGRAM(S): 10 TABLET, FILM COATED ORAL at 05:26

## 2023-01-07 RX ADMIN — APIXABAN 2.5 MILLIGRAM(S): 2.5 TABLET, FILM COATED ORAL at 05:26

## 2023-01-07 RX ADMIN — FAMOTIDINE 20 MILLIGRAM(S): 10 INJECTION INTRAVENOUS at 05:26

## 2023-01-07 NOTE — PROGRESS NOTE ADULT - NUTRITIONAL ASSESSMENT
This patient has been assessed with a concern for Malnutrition and has been determined to have a diagnosis/diagnoses of Severe protein-calorie malnutrition and Underweight (BMI < 19).    This patient is being managed with:   Diet Regular-  Entered: Jan 1 2023  6:17PM    
This patient has been assessed with a concern for Malnutrition and has been determined to have a diagnosis/diagnoses of Severe protein-calorie malnutrition and Underweight (BMI < 19).    This patient is being managed with:   Diet Regular-  Entered: Jan 1 2023  6:17PM

## 2023-01-07 NOTE — PROGRESS NOTE ADULT - PROBLEM SELECTOR PLAN 5
-c/w famotidine 20 mg QD.

## 2023-01-07 NOTE — PROGRESS NOTE ADULT - PROBLEM SELECTOR PLAN 4
-c/w escitalopram 10 mg QD.

## 2023-01-07 NOTE — PROGRESS NOTE ADULT - PROBLEM SELECTOR PLAN 2
-likely secondary to post-op changes; pt hemodynamically stable; will monitor CBC in am

## 2023-01-07 NOTE — PROGRESS NOTE ADULT - PROVIDER SPECIALTY LIST ADULT
Orthopedics
Hospitalist

## 2023-01-07 NOTE — PROGRESS NOTE ADULT - ASSESSMENT
89F h/o depression and GERD presenting with left hip pain after a mechanical fall at home today found to have left intertrochanteric fx now s/p Left IMN POD#1.
A/P: 89F with L IT Fx s/p IMN 1/1    Neuro: Pain control  Resp: IS  GI: Regular diet, bowel reg  MSK: WBAT, PT/OT  Heme: DVT PPX: Lovenox  Dispo: [facility]
Assessment and Plan    Patient is a 89y y/o Female who presented with  hip pain found to have  femoral Left intertrochanteric femur fracture. Patient is currently hemodynamically stable.     - Multimodal Pain control  - Bed Rest  - NPO/IVF   - Labs as above  - EKG/CXR  - Appreciate medicine recs: pt medically optimized for OR  - Plan for OR for IMN on 1/1/23    Orthopaedic Surgery  [facility] [id number]  [facility]        [id number]  [facility]  [id number]/ [phone]    
89F h/o depression and GERD presenting with left hip pain after a mechanical fall at home found to have left intertrochanteric fx now s/p Left IMN POD#3.
A/P: 89F with L IT Fx s/p IMN 1/1    Neuro: Pain control  Resp: IS  GI: Regular diet, bowel reg  MSK: WBAT, PT/OT  Heme: DVT PPX: Lovenox  Dispo: [facility]
89F h/o depression and GERD presenting with left hip pain after a mechanical fall at home found to have left intertrochanteric fx now s/p Left IMN POD#4.
89F h/o depression and GERD presenting with left hip pain after a mechanical fall at home found to have left intertrochanteric fx now s/p Left IMN POD#4.
89F h/o depression and GERD presenting with left hip pain after a mechanical fall at home today found to have left intertrochanteric fx now s/p Left IMN POD#1.
89F h/o depression and GERD presenting with left hip pain after a mechanical fall at home found to have left intertrochanteric fx now s/p Left IMN POD#3.

## 2023-01-07 NOTE — PROGRESS NOTE ADULT - SUBJECTIVE AND OBJECTIVE BOX
Orthopaedic Surgery Progress Note    Subjective:   Patient seen and examined. No acute events overnight. States pain is controlled. Denies fever/chills/chest pain/shortness of breath/numbness/tingling.    Objective:  T(C): 36.5 (01-07-23 @ 05:29), Max: 37.1 (01-06-23 @ 21:33)  HR: 85 (01-07-23 @ 05:29) (80 - 89)  BP: 140/64 (01-07-23 @ 05:29) (140/64 - 153/50)  RR: 18 (01-07-23 @ 05:29) (18 - 18)  SpO2: 99% (01-07-23 @ 05:29) (98% - 99%)  Wt(kg): --      Physical Exam:  Gen: Laying in bed, NAD, alert and oriented.   Resp: Unlabored breathing  LLExt:   Dressings c/d/i  EHL/FHL/TA/Sol intact          + SILT DP/SP/DUENAS/Sa/T          +DP, extremity WWP

## 2023-01-07 NOTE — PROGRESS NOTE ADULT - PROBLEM SELECTOR PLAN 6
-c/w lovenox subq daily

## 2023-01-07 NOTE — PROGRESS NOTE ADULT - PROBLEM SELECTOR PLAN 1
-Pain well controlled; continue management and pain control per ortho recs with tylenol tid and oxycodone IR prn   -c/w bowel regimen  -c/w incentive spirometer use  -c/w PT

## 2023-01-07 NOTE — PROGRESS NOTE ADULT - PROBLEM SELECTOR PROBLEM 1
Intertrochanteric fracture of left hip

## 2023-01-09 PROBLEM — Z00.00 ENCOUNTER FOR PREVENTIVE HEALTH EXAMINATION: Status: ACTIVE | Noted: 2023-01-09

## 2023-01-24 ENCOUNTER — NON-APPOINTMENT (OUTPATIENT)
Age: 88
End: 2023-01-24

## 2023-01-30 ENCOUNTER — APPOINTMENT (OUTPATIENT)
Dept: ORTHOPEDIC SURGERY | Facility: CLINIC | Age: 88
End: 2023-01-30
Payer: MEDICARE

## 2023-01-30 VITALS
BODY MASS INDEX: 18.65 KG/M2 | OXYGEN SATURATION: 95 % | HEART RATE: 107 BPM | WEIGHT: 95 LBS | SYSTOLIC BLOOD PRESSURE: 162 MMHG | HEIGHT: 60 IN | DIASTOLIC BLOOD PRESSURE: 73 MMHG

## 2023-01-30 PROCEDURE — 99024 POSTOP FOLLOW-UP VISIT: CPT

## 2023-01-30 PROCEDURE — 73502 X-RAY EXAM HIP UNI 2-3 VIEWS: CPT

## 2023-02-04 NOTE — REVIEW OF SYSTEMS
[Joint Pain] : no joint pain [Joint Stiffness] : no joint stiffness [Joint Swelling] : no joint swelling [Fever] : no fever [Chills] : no chills [Negative] : Endocrine [FreeTextEntry7] : GERD [de-identified] : Depression

## 2023-02-04 NOTE — PHYSICAL EXAM
[de-identified] : Constitutional:  89 year old female, alert and oriented, cooperative, in no acute distress.\par \par HEENT \par NC/AT.  Appearance: symmetric\par \par Neck/Back\par Straight without deformity or instability.  Good ROM.\par \par Chest/Respiratory \par Respiratory effort: no intercostal retractions or use of accessory muscles. Nonlabored Breathing\par \par Mental Status: \par Judgment, insight: intact\par Orientation: oriented to time, place, and person\par \par Neurological:\par Sensory and Motor are grossly intact throughout\par \par Left Hip Exam:\par Inspection/Appearance:\par      Incisions well healing, no erythema or drainage\par \par Range of Motion:\par                 Extension - 0 \par 	Flexion - 100 \par 	IR - 10\par 	ER - 20 \par 	Abd - 30 \par 	Add - 30 \par \par Neurologic Exam\par     Motor intact including 5/5 Extensor Hallucis Longus, 5/5 Flexor Hallucis Longus, 5/5 Tibialis Anterior and 5/5 Gastrocnemius\par     Sensation Intact to Light Touch including Saphenous, Sural, Superficial Peroneal, Deep Peroneal, Tibial nerve distributions\par \par Vascular Exam\par     Foot is warm and well perfused with 2+ Dorsalis Pedis Pulse\par \par No pain with range of motion of the right hip or bilateral knees. No lumbar paraspinal muscle tenderness.  [de-identified] : XRay:  XRays of the Pelvis (1 View) and Left Hip (2 Views) taken in the office today and discussed with the patient. XRays show a left hip intramedullary nail in good position. There is no evidence of hardware failure or loosening. Fracture reduction appears well aligned. (my personal interpretation).\par

## 2023-02-04 NOTE — DISCUSSION/SUMMARY
[de-identified] : Ms. Ashford is a 89-year-old female who presented to the office, with her daughter, for follow-up of her left intratrochanteric hip fracture.  Patient underwent left hip intramedullary nail on 1/1/2023.  She was discharged home and is currently doing well.  Patient is walking well with a walker.  She reports no significant hip pain at this time.  X-rays showed left hip intramedullary nail in good position and alignment.  Fracture reduction was well aligned.  Examination showed no significant pain with hip range of motion.  Discussed with the patient examination and imaging findings.  Discussed with the patient the recovery from left hip intramedullary nail, including physical therapy and DVT prophylaxis.  Patient was given a referral to outpatient physical therapy.  She will remain on Eliquis for a total of 4 weeks postoperatively.  Patient will then change to Aspirin 81 mg twice per day for 2 weeks, with a total DVT prophylaxis of 6 weeks.  Patient was given a prescription for Aspirin 81 mg twice per day for 2 weeks.  Patient will remain weightbearing as tolerated.  Discussed continued use of the walker and fall precautions.  Patient will follow-up in 3 weeks for reevaluation and management.  Patient understanding and in agreement with the plan.  All questions answered.\par \par Plan:\par -Physical therapy\par -Continue Eliquis for a total of 4 weeks postoperatively.  Patient will then change to Aspirin 81 mg twice per day for 2 weeks, with a total DVT prophylaxis of 6 weeks\par -Left lower extremity: Weightbearing as tolerated with walker\par -Fall precautions\par -Follow-up in 3 weeks for reevaluation and management\par

## 2023-02-04 NOTE — HISTORY OF PRESENT ILLNESS
[de-identified] : Ms. Ashford is a 89 year old female who presented to the office, with her daughter, for follow up of her left intertrochanteric hip fracture. Patient underwent left hip intramedullary nail on 1/1/2023. She was discharged home to her daughter's house. Patient is currently doing well with home physical therapy. Patient is currently walking with a walker. She is participating in home physical therapy.  Patient states that her pain is well controlled and she only has occasional hip pain.  She is currently on Eliquis for DVT prophylaxis.  No subsequent trauma.  No fevers or chills.

## 2023-03-23 ENCOUNTER — APPOINTMENT (OUTPATIENT)
Dept: ORTHOPEDIC SURGERY | Facility: CLINIC | Age: 88
End: 2023-03-23
Payer: MEDICARE

## 2023-03-23 DIAGNOSIS — S72.142A DISPLACED INTERTROCHANTERIC FRACTURE OF LEFT FEMUR, INITIAL ENCOUNTER FOR CLOSED FRACTURE: ICD-10-CM

## 2023-03-23 PROCEDURE — 99024 POSTOP FOLLOW-UP VISIT: CPT

## 2023-03-23 PROCEDURE — 73502 X-RAY EXAM HIP UNI 2-3 VIEWS: CPT

## 2023-03-25 PROBLEM — S72.142A INTERTROCHANTERIC FRACTURE OF LEFT HIP: Status: ACTIVE | Noted: 2023-01-30

## 2023-03-25 NOTE — DISCUSSION/SUMMARY
[de-identified] : Ms. Ashford is a 89-year-old female who presented to the office, with her daughter, for follow-up of her left intratrochanteric hip fracture.  Patient underwent left hip intramedullary nail on 1/1/2023.  Patient is currently doing well at this time.  She is participating in physical therapy and reports no significant hip pain at this time.  X-rays showed left hip intramedullary nail in good position and alignment.  Fracture reduction was well aligned.  Examination showed no significant pain with hip range of motion.  Discussed with the patient the examination and imaging findings.  Discussed with the patient the recovery from left hip intramedullary nail, including physical therapy.  Patient was given another referral to outpatient physical therapy.  Patient will remain weightbearing as tolerated.  Discussed continued use of the walker and fall precautions.  Patient will follow-up in 3 months for reevaluation and management.  Patient understanding and in agreement with the plan.  All questions answered.\par \par Plan:\par -Physical therapy\par -Left lower extremity: Weightbearing as tolerated with walker\par -Fall precautions\par -Follow-up in 3 months for reevaluation and management\par

## 2023-03-25 NOTE — PHYSICAL EXAM
[de-identified] : Constitutional:  89 year old female, alert and oriented, cooperative, in no acute distress.\par \par HEENT \par NC/AT.  Appearance: symmetric\par \par Neck/Back\par Straight without deformity or instability.  Good ROM.\par \par Chest/Respiratory \par Respiratory effort: no intercostal retractions or use of accessory muscles. Nonlabored Breathing\par \par Mental Status: \par Judgment, insight: intact\par Orientation: oriented to time, place, and person\par \par Neurological:\par Sensory and Motor are grossly intact throughout\par \par Left Hip Exam:\par Inspection/Appearance:\par      Incisions well healing, no erythema or drainage\par \par Range of Motion:\par                 Extension - 0 \par 	Flexion - 100 \par 	IR - 10\par 	ER - 20 \par 	Abd - 30 \par 	Add - 30 \par \par Neurologic Exam\par     Motor intact including 5/5 Extensor Hallucis Longus, 5/5 Flexor Hallucis Longus, 5/5 Tibialis Anterior and 5/5 Gastrocnemius\par     Sensation Intact to Light Touch including Saphenous, Sural, Superficial Peroneal, Deep Peroneal, Tibial nerve distributions\par \par Vascular Exam\par     Foot is warm and well perfused with 2+ Dorsalis Pedis Pulse\par \par No pain with range of motion of the right hip or bilateral knees. No lumbar paraspinal muscle tenderness.  [de-identified] : XRay:  XRays of the Pelvis (1 View) and Left Hip (2 Views) taken in the office today and discussed with the patient. XRays show a left hip intramedullary nail in good position. There is no evidence of hardware failure or loosening. Fracture reduction appears well aligned. (my personal interpretation).\par

## 2023-03-25 NOTE — HISTORY OF PRESENT ILLNESS
[de-identified] : Ms. Ashford is a 89 year old female who presented to the office, with her daughter, for follow up of her left intertrochanteric hip fracture. Patient underwent left hip intramedullary nail on 1/1/2023. She was discharged home to her daughter's house. Patient is currently doing well with outpatient physical therapy. Patient is currently walking with a walker. She is participating in physical therapy.  Patient states that her pain is well controlled and she only has no significant hip pain. No subsequent trauma.  No fevers or chills.  No subsequent falls or injuries.

## 2023-03-25 NOTE — REVIEW OF SYSTEMS
[Negative] : Neurological [Joint Pain] : no joint pain [Joint Stiffness] : no joint stiffness [Joint Swelling] : no joint swelling [Fever] : no fever [FreeTextEntry7] : GERD [Chills] : no chills [de-identified] : Depression

## 2023-04-12 NOTE — ED ADULT NURSE NOTE - CARDIO ASSESSMENT
--- Hydroxyzine Counseling: Patient advised that the medication is sedating and not to drive a car after taking this medication.  Patient informed of potential adverse effects including but not limited to dry mouth, urinary retention, and blurry vision.  The patient verbalized understanding of the proper use and possible adverse effects of hydroxyzine.  All of the patient's questions and concerns were addressed.
